# Patient Record
Sex: FEMALE | Race: WHITE | HISPANIC OR LATINO
[De-identification: names, ages, dates, MRNs, and addresses within clinical notes are randomized per-mention and may not be internally consistent; named-entity substitution may affect disease eponyms.]

---

## 2017-08-08 ENCOUNTER — APPOINTMENT (OUTPATIENT)
Dept: ULTRASOUND IMAGING | Facility: HOSPITAL | Age: 42
End: 2017-08-08
Payer: COMMERCIAL

## 2017-08-08 ENCOUNTER — OUTPATIENT (OUTPATIENT)
Dept: OUTPATIENT SERVICES | Facility: HOSPITAL | Age: 42
LOS: 1 days | End: 2017-08-08
Payer: COMMERCIAL

## 2017-08-08 PROCEDURE — 76536 US EXAM OF HEAD AND NECK: CPT

## 2017-08-08 PROCEDURE — 76536 US EXAM OF HEAD AND NECK: CPT | Mod: 26

## 2018-01-18 ENCOUNTER — OUTPATIENT (OUTPATIENT)
Dept: OUTPATIENT SERVICES | Facility: HOSPITAL | Age: 43
LOS: 1 days | End: 2018-01-18
Payer: COMMERCIAL

## 2018-01-18 ENCOUNTER — APPOINTMENT (OUTPATIENT)
Dept: MRI IMAGING | Facility: HOSPITAL | Age: 43
End: 2018-01-18
Payer: COMMERCIAL

## 2018-01-18 DIAGNOSIS — Z00.8 ENCOUNTER FOR OTHER GENERAL EXAMINATION: ICD-10-CM

## 2018-01-18 PROCEDURE — 70553 MRI BRAIN STEM W/O & W/DYE: CPT

## 2018-01-18 PROCEDURE — 70553 MRI BRAIN STEM W/O & W/DYE: CPT | Mod: 26

## 2018-01-18 PROCEDURE — A9579: CPT

## 2018-01-25 ENCOUNTER — OUTPATIENT (OUTPATIENT)
Dept: OUTPATIENT SERVICES | Facility: HOSPITAL | Age: 43
LOS: 1 days | End: 2018-01-25
Payer: COMMERCIAL

## 2018-01-25 ENCOUNTER — APPOINTMENT (OUTPATIENT)
Dept: ULTRASOUND IMAGING | Facility: CLINIC | Age: 43
End: 2018-01-25
Payer: COMMERCIAL

## 2018-01-25 DIAGNOSIS — Z00.8 ENCOUNTER FOR OTHER GENERAL EXAMINATION: ICD-10-CM

## 2018-01-25 PROCEDURE — 76536 US EXAM OF HEAD AND NECK: CPT

## 2018-01-25 PROCEDURE — 76536 US EXAM OF HEAD AND NECK: CPT | Mod: 26

## 2018-05-24 ENCOUNTER — APPOINTMENT (OUTPATIENT)
Dept: NEUROLOGY | Facility: CLINIC | Age: 43
End: 2018-05-24

## 2018-08-14 ENCOUNTER — OUTPATIENT (OUTPATIENT)
Dept: OUTPATIENT SERVICES | Facility: HOSPITAL | Age: 43
LOS: 1 days | End: 2018-08-14
Payer: COMMERCIAL

## 2018-08-14 ENCOUNTER — APPOINTMENT (OUTPATIENT)
Dept: ULTRASOUND IMAGING | Facility: HOSPITAL | Age: 43
End: 2018-08-14
Payer: COMMERCIAL

## 2018-08-14 DIAGNOSIS — Z00.8 ENCOUNTER FOR OTHER GENERAL EXAMINATION: ICD-10-CM

## 2018-08-14 PROCEDURE — 76830 TRANSVAGINAL US NON-OB: CPT | Mod: 26

## 2018-08-14 PROCEDURE — 76830 TRANSVAGINAL US NON-OB: CPT

## 2018-08-14 PROCEDURE — 76856 US EXAM PELVIC COMPLETE: CPT

## 2018-08-14 PROCEDURE — 76856 US EXAM PELVIC COMPLETE: CPT | Mod: 26

## 2018-08-15 ENCOUNTER — FORM ENCOUNTER (OUTPATIENT)
Age: 43
End: 2018-08-15

## 2018-08-16 ENCOUNTER — OUTPATIENT (OUTPATIENT)
Dept: OUTPATIENT SERVICES | Facility: HOSPITAL | Age: 43
LOS: 1 days | End: 2018-08-16
Payer: COMMERCIAL

## 2018-08-16 ENCOUNTER — APPOINTMENT (OUTPATIENT)
Dept: MRI IMAGING | Facility: HOSPITAL | Age: 43
End: 2018-08-16
Payer: COMMERCIAL

## 2018-08-16 DIAGNOSIS — Z00.8 ENCOUNTER FOR OTHER GENERAL EXAMINATION: ICD-10-CM

## 2018-08-16 PROCEDURE — 73721 MRI JNT OF LWR EXTRE W/O DYE: CPT | Mod: 26,RT

## 2018-08-16 PROCEDURE — 73721 MRI JNT OF LWR EXTRE W/O DYE: CPT

## 2019-04-22 ENCOUNTER — OUTPATIENT (OUTPATIENT)
Dept: OUTPATIENT SERVICES | Facility: HOSPITAL | Age: 44
LOS: 1 days | End: 2019-04-22
Payer: COMMERCIAL

## 2019-04-22 DIAGNOSIS — R07.9 CHEST PAIN, UNSPECIFIED: ICD-10-CM

## 2019-04-22 PROCEDURE — 71046 X-RAY EXAM CHEST 2 VIEWS: CPT

## 2019-04-22 PROCEDURE — 93306 TTE W/DOPPLER COMPLETE: CPT | Mod: 26

## 2019-04-22 PROCEDURE — 71046 X-RAY EXAM CHEST 2 VIEWS: CPT | Mod: 26

## 2019-04-22 PROCEDURE — 93306 TTE W/DOPPLER COMPLETE: CPT

## 2019-04-26 ENCOUNTER — APPOINTMENT (OUTPATIENT)
Dept: ULTRASOUND IMAGING | Facility: CLINIC | Age: 44
End: 2019-04-26
Payer: COMMERCIAL

## 2019-04-26 ENCOUNTER — APPOINTMENT (OUTPATIENT)
Dept: RADIOLOGY | Facility: CLINIC | Age: 44
End: 2019-04-26

## 2019-04-26 ENCOUNTER — OUTPATIENT (OUTPATIENT)
Dept: OUTPATIENT SERVICES | Facility: HOSPITAL | Age: 44
LOS: 1 days | End: 2019-04-26
Payer: COMMERCIAL

## 2019-04-26 DIAGNOSIS — Z00.8 ENCOUNTER FOR OTHER GENERAL EXAMINATION: ICD-10-CM

## 2019-04-26 PROCEDURE — 76881 US COMPL JOINT R-T W/IMG: CPT | Mod: 26,RT

## 2019-04-26 PROCEDURE — 76881 US COMPL JOINT R-T W/IMG: CPT

## 2019-04-30 ENCOUNTER — TRANSCRIPTION ENCOUNTER (OUTPATIENT)
Age: 44
End: 2019-04-30

## 2020-01-20 ENCOUNTER — APPOINTMENT (OUTPATIENT)
Dept: CARDIOLOGY | Facility: CLINIC | Age: 45
End: 2020-01-20
Payer: COMMERCIAL

## 2020-01-20 ENCOUNTER — NON-APPOINTMENT (OUTPATIENT)
Age: 45
End: 2020-01-20

## 2020-01-20 VITALS
WEIGHT: 183 LBS | HEART RATE: 78 BPM | DIASTOLIC BLOOD PRESSURE: 69 MMHG | SYSTOLIC BLOOD PRESSURE: 118 MMHG | BODY MASS INDEX: 31.24 KG/M2 | HEIGHT: 64 IN | OXYGEN SATURATION: 96 % | RESPIRATION RATE: 17 BRPM

## 2020-01-20 DIAGNOSIS — R00.2 PALPITATIONS: ICD-10-CM

## 2020-01-20 DIAGNOSIS — Z82.49 FAMILY HISTORY OF ISCHEMIC HEART DISEASE AND OTHER DISEASES OF THE CIRCULATORY SYSTEM: ICD-10-CM

## 2020-01-20 DIAGNOSIS — Z87.19 PERSONAL HISTORY OF OTHER DISEASES OF THE DIGESTIVE SYSTEM: ICD-10-CM

## 2020-01-20 DIAGNOSIS — F41.9 ANXIETY DISORDER, UNSPECIFIED: ICD-10-CM

## 2020-01-20 DIAGNOSIS — Z83.3 FAMILY HISTORY OF DIABETES MELLITUS: ICD-10-CM

## 2020-01-20 DIAGNOSIS — Z85.42 PERSONAL HISTORY OF MALIGNANT NEOPLASM OF OTHER PARTS OF UTERUS: ICD-10-CM

## 2020-01-20 PROCEDURE — 93000 ELECTROCARDIOGRAM COMPLETE: CPT

## 2020-01-20 PROCEDURE — 99244 OFF/OP CNSLTJ NEW/EST MOD 40: CPT

## 2020-01-20 PROCEDURE — 93306 TTE W/DOPPLER COMPLETE: CPT

## 2020-01-20 RX ORDER — ALPRAZOLAM 0.25 MG/1
0.25 TABLET ORAL
Refills: 0 | Status: ACTIVE | COMMUNITY

## 2020-01-20 RX ORDER — FEXOFENADINE HYDROCHLORIDE 180 MG/1
180 TABLET ORAL
Refills: 0 | Status: ACTIVE | COMMUNITY

## 2020-01-20 NOTE — DISCUSSION/SUMMARY
[Risks] : risks [Benefits] : benefits [Patient] : the patient [Alternatives] : alternatives [FreeTextEntry1] : Discussed with patient favor echo today obtain results of blood testing. Presented for stress testing with imaging in further discussion.

## 2020-01-20 NOTE — PHYSICAL EXAM
[General Appearance - Well Developed] : well developed [Normal Appearance] : normal appearance [Well Groomed] : well groomed [General Appearance - Well Nourished] : well nourished [No Deformities] : no deformities [Normal Conjunctiva] : the conjunctiva exhibited no abnormalities [General Appearance - In No Acute Distress] : no acute distress [Eyelids - No Xanthelasma] : the eyelids demonstrated no xanthelasmas [No Oral Pallor] : no oral pallor [No Oral Cyanosis] : no oral cyanosis [Heart Rate And Rhythm] : heart rate and rhythm were normal [Heart Sounds] : normal S1 and S2 [Murmurs] : no murmurs present [Arterial Pulses Normal] : the arterial pulses were normal [Edema] : no peripheral edema present [Respiration, Rhythm And Depth] : normal respiratory rhythm and effort [Exaggerated Use Of Accessory Muscles For Inspiration] : no accessory muscle use [Auscultation Breath Sounds / Voice Sounds] : lungs were clear to auscultation bilaterally [Abdomen Soft] : soft [Abdomen Tenderness] : non-tender [Abdomen Mass (___ Cm)] : no abdominal mass palpated [] : no hepato-splenomegaly [Gait - Sufficient For Exercise Testing] : the gait was sufficient for exercise testing [Abnormal Walk] : normal gait [Nail Clubbing] : no clubbing of the fingernails [Cyanosis, Localized] : no localized cyanosis [Skin Turgor] : normal skin turgor [Skin Color & Pigmentation] : normal skin color and pigmentation [Affect] : the affect was normal [Mood] : the mood was normal [FreeTextEntry1] : JVP normal. No bruits

## 2020-01-20 NOTE — ASSESSMENT
[FreeTextEntry1] : 44-year-old woman with dyspnea on exertion overweight. Family history of unknown type of heart disease and surgery involving her sister.\par \par Premature surgical menopause

## 2020-01-20 NOTE — HISTORY OF PRESENT ILLNESS
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 Patient has some dyspnea on exertion shortness of breath and palpitations when she exerts. She is not very active physically. She has no personal cardiac history.\par \par She denies hypertension or diabetes. She has has some elevation of cholesterol she believes in the past.\par \par Past history of endometrial carcinoma treated surgically. Additional history of asthma noted below and the active problem apparently.\par \par She is a nonsmoker nondrinker pre-K. teacher.\par \par She does not exercise.\par \par Gained weight after her hysterectomy.\par \par

## 2020-02-20 ENCOUNTER — APPOINTMENT (OUTPATIENT)
Dept: CARDIOLOGY | Facility: CLINIC | Age: 45
End: 2020-02-20
Payer: COMMERCIAL

## 2020-02-20 PROCEDURE — 93351 STRESS TTE COMPLETE: CPT

## 2020-02-20 PROCEDURE — 93320 DOPPLER ECHO COMPLETE: CPT

## 2020-02-20 PROCEDURE — 93325 DOPPLER ECHO COLOR FLOW MAPG: CPT

## 2020-08-08 ENCOUNTER — APPOINTMENT (OUTPATIENT)
Dept: MRI IMAGING | Facility: HOSPITAL | Age: 45
End: 2020-08-08
Payer: COMMERCIAL

## 2020-08-08 ENCOUNTER — OUTPATIENT (OUTPATIENT)
Dept: OUTPATIENT SERVICES | Facility: HOSPITAL | Age: 45
LOS: 1 days | End: 2020-08-08
Payer: COMMERCIAL

## 2020-08-08 DIAGNOSIS — Z00.8 ENCOUNTER FOR OTHER GENERAL EXAMINATION: ICD-10-CM

## 2020-08-08 PROCEDURE — 70553 MRI BRAIN STEM W/O & W/DYE: CPT

## 2020-08-08 PROCEDURE — 70553 MRI BRAIN STEM W/O & W/DYE: CPT | Mod: 26

## 2020-08-08 PROCEDURE — A9579: CPT

## 2020-08-21 ENCOUNTER — APPOINTMENT (OUTPATIENT)
Dept: ULTRASOUND IMAGING | Facility: CLINIC | Age: 45
End: 2020-08-21
Payer: COMMERCIAL

## 2020-08-21 ENCOUNTER — OUTPATIENT (OUTPATIENT)
Dept: OUTPATIENT SERVICES | Facility: HOSPITAL | Age: 45
LOS: 1 days | End: 2020-08-21
Payer: COMMERCIAL

## 2020-08-21 DIAGNOSIS — Z00.8 ENCOUNTER FOR OTHER GENERAL EXAMINATION: ICD-10-CM

## 2020-08-21 PROCEDURE — 76881 US COMPL JOINT R-T W/IMG: CPT | Mod: 26,RT

## 2020-08-21 PROCEDURE — 76881 US COMPL JOINT R-T W/IMG: CPT

## 2020-09-03 ENCOUNTER — TRANSCRIPTION ENCOUNTER (OUTPATIENT)
Age: 45
End: 2020-09-03

## 2020-11-22 ENCOUNTER — TRANSCRIPTION ENCOUNTER (OUTPATIENT)
Age: 45
End: 2020-11-22

## 2020-12-10 ENCOUNTER — TRANSCRIPTION ENCOUNTER (OUTPATIENT)
Age: 45
End: 2020-12-10

## 2021-02-24 ENCOUNTER — TRANSCRIPTION ENCOUNTER (OUTPATIENT)
Age: 46
End: 2021-02-24

## 2021-03-07 ENCOUNTER — TRANSCRIPTION ENCOUNTER (OUTPATIENT)
Age: 46
End: 2021-03-07

## 2021-04-01 ENCOUNTER — TRANSCRIPTION ENCOUNTER (OUTPATIENT)
Age: 46
End: 2021-04-01

## 2021-04-13 ENCOUNTER — RESULT REVIEW (OUTPATIENT)
Age: 46
End: 2021-04-13

## 2021-06-13 ENCOUNTER — TRANSCRIPTION ENCOUNTER (OUTPATIENT)
Age: 46
End: 2021-06-13

## 2021-07-22 ENCOUNTER — OUTPATIENT (OUTPATIENT)
Dept: OUTPATIENT SERVICES | Facility: HOSPITAL | Age: 46
LOS: 1 days | End: 2021-07-22
Payer: COMMERCIAL

## 2021-07-22 ENCOUNTER — APPOINTMENT (OUTPATIENT)
Dept: RADIOLOGY | Facility: HOSPITAL | Age: 46
End: 2021-07-22
Payer: COMMERCIAL

## 2021-07-22 DIAGNOSIS — Z00.8 ENCOUNTER FOR OTHER GENERAL EXAMINATION: ICD-10-CM

## 2021-07-22 PROCEDURE — 73130 X-RAY EXAM OF HAND: CPT | Mod: 26,LT

## 2021-07-22 PROCEDURE — 73130 X-RAY EXAM OF HAND: CPT

## 2021-08-06 ENCOUNTER — OUTPATIENT (OUTPATIENT)
Dept: OUTPATIENT SERVICES | Facility: HOSPITAL | Age: 46
LOS: 1 days | End: 2021-08-06
Payer: COMMERCIAL

## 2021-08-06 ENCOUNTER — APPOINTMENT (OUTPATIENT)
Dept: MRI IMAGING | Facility: HOSPITAL | Age: 46
End: 2021-08-06
Payer: COMMERCIAL

## 2021-08-06 DIAGNOSIS — M23.91 UNSPECIFIED INTERNAL DERANGEMENT OF RIGHT KNEE: ICD-10-CM

## 2021-08-06 PROCEDURE — 73721 MRI JNT OF LWR EXTRE W/O DYE: CPT | Mod: 26,RT

## 2021-08-06 PROCEDURE — 73721 MRI JNT OF LWR EXTRE W/O DYE: CPT

## 2022-04-11 ENCOUNTER — EMERGENCY (EMERGENCY)
Facility: HOSPITAL | Age: 47
LOS: 1 days | Discharge: HOME | End: 2022-04-11
Attending: EMERGENCY MEDICINE | Admitting: INTERNAL MEDICINE
Payer: COMMERCIAL

## 2022-04-11 VITALS
DIASTOLIC BLOOD PRESSURE: 65 MMHG | HEART RATE: 61 BPM | RESPIRATION RATE: 20 BRPM | SYSTOLIC BLOOD PRESSURE: 132 MMHG | OXYGEN SATURATION: 96 %

## 2022-04-11 VITALS
HEART RATE: 88 BPM | TEMPERATURE: 99 F | RESPIRATION RATE: 16 BRPM | DIASTOLIC BLOOD PRESSURE: 89 MMHG | OXYGEN SATURATION: 97 % | WEIGHT: 192.02 LBS | HEIGHT: 64 IN | SYSTOLIC BLOOD PRESSURE: 133 MMHG

## 2022-04-11 LAB
ALBUMIN SERPL ELPH-MCNC: 3.7 G/DL — SIGNIFICANT CHANGE UP (ref 3.3–5)
ALP SERPL-CCNC: 84 U/L — SIGNIFICANT CHANGE UP (ref 40–120)
ALT FLD-CCNC: 31 U/L — SIGNIFICANT CHANGE UP (ref 10–45)
ANION GAP SERPL CALC-SCNC: 9 MMOL/L — SIGNIFICANT CHANGE UP (ref 5–17)
AST SERPL-CCNC: 17 U/L — SIGNIFICANT CHANGE UP (ref 10–40)
BASOPHILS # BLD AUTO: 0.03 K/UL — SIGNIFICANT CHANGE UP (ref 0–0.2)
BASOPHILS NFR BLD AUTO: 0.4 % — SIGNIFICANT CHANGE UP (ref 0–2)
BILIRUB SERPL-MCNC: 0.3 MG/DL — SIGNIFICANT CHANGE UP (ref 0.2–1.2)
BUN SERPL-MCNC: 13 MG/DL — SIGNIFICANT CHANGE UP (ref 7–23)
CALCIUM SERPL-MCNC: 9.2 MG/DL — SIGNIFICANT CHANGE UP (ref 8.4–10.5)
CHLORIDE SERPL-SCNC: 106 MMOL/L — SIGNIFICANT CHANGE UP (ref 96–108)
CO2 SERPL-SCNC: 28 MMOL/L — SIGNIFICANT CHANGE UP (ref 22–31)
CREAT SERPL-MCNC: 0.97 MG/DL — SIGNIFICANT CHANGE UP (ref 0.5–1.3)
D DIMER BLD IA.RAPID-MCNC: <150 NG/ML DDU — SIGNIFICANT CHANGE UP
EGFR: 72 ML/MIN/1.73M2 — SIGNIFICANT CHANGE UP
EOSINOPHIL # BLD AUTO: 0.23 K/UL — SIGNIFICANT CHANGE UP (ref 0–0.5)
EOSINOPHIL NFR BLD AUTO: 3.2 % — SIGNIFICANT CHANGE UP (ref 0–6)
GLUCOSE SERPL-MCNC: 126 MG/DL — HIGH (ref 70–99)
HCG SERPL-ACNC: 3 MIU/ML — SIGNIFICANT CHANGE UP
HCT VFR BLD CALC: 42.3 % — SIGNIFICANT CHANGE UP (ref 34.5–45)
HGB BLD-MCNC: 14.4 G/DL — SIGNIFICANT CHANGE UP (ref 11.5–15.5)
IMM GRANULOCYTES NFR BLD AUTO: 0.3 % — SIGNIFICANT CHANGE UP (ref 0–1.5)
LIDOCAIN IGE QN: 99 U/L — SIGNIFICANT CHANGE UP (ref 73–393)
LYMPHOCYTES # BLD AUTO: 2.29 K/UL — SIGNIFICANT CHANGE UP (ref 1–3.3)
LYMPHOCYTES # BLD AUTO: 32.1 % — SIGNIFICANT CHANGE UP (ref 13–44)
MCHC RBC-ENTMCNC: 28.8 PG — SIGNIFICANT CHANGE UP (ref 27–34)
MCHC RBC-ENTMCNC: 34 GM/DL — SIGNIFICANT CHANGE UP (ref 32–36)
MCV RBC AUTO: 84.6 FL — SIGNIFICANT CHANGE UP (ref 80–100)
MONOCYTES # BLD AUTO: 0.52 K/UL — SIGNIFICANT CHANGE UP (ref 0–0.9)
MONOCYTES NFR BLD AUTO: 7.3 % — SIGNIFICANT CHANGE UP (ref 2–14)
NEUTROPHILS # BLD AUTO: 4.04 K/UL — SIGNIFICANT CHANGE UP (ref 1.8–7.4)
NEUTROPHILS NFR BLD AUTO: 56.7 % — SIGNIFICANT CHANGE UP (ref 43–77)
NRBC # BLD: 0 /100 WBCS — SIGNIFICANT CHANGE UP (ref 0–0)
PLATELET # BLD AUTO: 285 K/UL — SIGNIFICANT CHANGE UP (ref 150–400)
POTASSIUM SERPL-MCNC: 3.6 MMOL/L — SIGNIFICANT CHANGE UP (ref 3.5–5.3)
POTASSIUM SERPL-SCNC: 3.6 MMOL/L — SIGNIFICANT CHANGE UP (ref 3.5–5.3)
PROT SERPL-MCNC: 6.8 G/DL — SIGNIFICANT CHANGE UP (ref 6–8.3)
RBC # BLD: 5 M/UL — SIGNIFICANT CHANGE UP (ref 3.8–5.2)
RBC # FLD: 12.5 % — SIGNIFICANT CHANGE UP (ref 10.3–14.5)
SODIUM SERPL-SCNC: 143 MMOL/L — SIGNIFICANT CHANGE UP (ref 135–145)
TROPONIN I, HIGH SENSITIVITY RESULT: 6 NG/L — SIGNIFICANT CHANGE UP
TROPONIN I, HIGH SENSITIVITY RESULT: 6.1 NG/L — SIGNIFICANT CHANGE UP
WBC # BLD: 7.13 K/UL — SIGNIFICANT CHANGE UP (ref 3.8–10.5)
WBC # FLD AUTO: 7.13 K/UL — SIGNIFICANT CHANGE UP (ref 3.8–10.5)

## 2022-04-11 PROCEDURE — 93005 ELECTROCARDIOGRAM TRACING: CPT

## 2022-04-11 PROCEDURE — 93010 ELECTROCARDIOGRAM REPORT: CPT

## 2022-04-11 PROCEDURE — 71045 X-RAY EXAM CHEST 1 VIEW: CPT | Mod: 26

## 2022-04-11 PROCEDURE — 83690 ASSAY OF LIPASE: CPT

## 2022-04-11 PROCEDURE — 85379 FIBRIN DEGRADATION QUANT: CPT

## 2022-04-11 PROCEDURE — 36415 COLL VENOUS BLD VENIPUNCTURE: CPT

## 2022-04-11 PROCEDURE — 84484 ASSAY OF TROPONIN QUANT: CPT

## 2022-04-11 PROCEDURE — 71045 X-RAY EXAM CHEST 1 VIEW: CPT

## 2022-04-11 PROCEDURE — 84702 CHORIONIC GONADOTROPIN TEST: CPT

## 2022-04-11 PROCEDURE — 99285 EMERGENCY DEPT VISIT HI MDM: CPT | Mod: 25

## 2022-04-11 PROCEDURE — 85025 COMPLETE CBC W/AUTO DIFF WBC: CPT

## 2022-04-11 PROCEDURE — 99285 EMERGENCY DEPT VISIT HI MDM: CPT

## 2022-04-11 PROCEDURE — 80053 COMPREHEN METABOLIC PANEL: CPT

## 2022-04-11 RX ORDER — PHENTERMINE HCL 30 MG
1 CAPSULE ORAL
Qty: 0 | Refills: 0 | DISCHARGE

## 2022-04-11 NOTE — ED ADULT NURSE NOTE - OBJECTIVE STATEMENT
Patient states to have had chest pain starting today that ranged from chest to upper L head. Complaint of nausea and abdomen pain of LLQ. Ate about a hour ago cheese and crackers. Complaint sensitively, Started a new medication appetite supression medication about a month ago. Patient states to have had chest pain starting today that ranged from chest to upper L head. Complaint of nausea and abdomen pain of LLQ. Ate about a hour ago cheese and crackers. States to have blurry vision during this epsiode. Complaint sensitively, Started a new medication appetite suppression medication about a month ago.

## 2022-04-11 NOTE — ED PROVIDER NOTE - CLINICAL SUMMARY MEDICAL DECISION MAKING FREE TEXT BOX
pt c brief atypical chest pain with near syncopal episode today. sxs resolved. normal exam, vitals here. neuro intact. check labs, ekg. r/o arrhymia, acs, dehydration, electrolyte abnormality, anemia. reassess    update: ekg, labs unremarkable. vss. remains without sxs. f/u pmd

## 2022-04-11 NOTE — ED PROVIDER NOTE - PATIENT PORTAL LINK FT
You can access the FollowMyHealth Patient Portal offered by [facility] by registering at the following website: [email or web address]. By joining [facility]’s FollowMyHealth portal, you will also be able to view your health information using other applications (apps) compatible with our system.

## 2022-04-11 NOTE — ED PROVIDER NOTE - WR INTERPRETED BY 1
Raiing. [address] [place] [address], [place] [address]. All rights reserved. This information is not intended as a substitute for professional medical care. Always follow your healthcare professional's instructions.        
Nilson Melendez

## 2022-04-11 NOTE — ED ADULT NURSE REASSESSMENT NOTE - NS ED NURSE REASSESS COMMENT FT1
After pt discharged and IV site was dressed, pt noted some blood from site.  She pointed this out to the nurse.  When changing to new dsg for old IV site, pt "fainted" at site of blood, which she reports she has done in the past.  Notified MD Melendez, VSS and pt monitored before we let leave again.

## 2022-04-11 NOTE — ED PROVIDER NOTE - OBJECTIVE STATEMENT
pt c/o mid chest pain, mild, pinching, mid afternoon today, followed by feeling flushed in head, lightheaded, felt like almost passed out for few seconds but no complete LOC, witnessed. CP resolved quickly.  no sweats, n/v, no weakness, numbness, speech or vision changes. no palpitations. no recent illness .

## 2022-04-11 NOTE — ED PROVIDER NOTE - NSFOLLOWUPINSTRUCTIONS_ED_ALL_ED_FT
Follow Up in 1-3 Days with your own doctor or with  [address] [address]  Phone: [phone]    Chest Pain    WHAT YOU NEED TO KNOW:    Chest pain can be caused by a range of conditions, from not serious to life-threatening. Chest pain can be a symptom of a digestive problem, such as acid reflux or a stomach ulcer. An anxiety attack or a strong emotion, such as anger, can also cause chest pain. Infection, inflammation, or a fracture in the bones or cartilage in your chest can cause pain or discomfort. Sometimes chest pain or pressure is caused by poor blood flow to your heart (angina). Chest pain may also be caused by life-threatening conditions such as a heart attack or blood clot in your lungs.     DISCHARGE INSTRUCTIONS:    Call 911 if:   You have any of the following signs of a heart attack:   Squeezing, pressure, or pain in your chest      You may also have any of the following:   Discomfort or pain in your back, neck, jaw, stomach, or arm    Shortness of breath    Nausea or vomiting    Lightheadedness or a sudden cold sweat    Return to the emergency department if:     You have chest discomfort that gets worse, even with medicine.    You cough or vomit blood.     Your bowel movements are black or bloody.     You cannot stop vomiting, or it hurts to swallow.     Contact your healthcare provider if:     You have questions or concerns about your condition or care.        Medicines:     Medicines may be given to treat the cause of your chest pain. Examples include pain medicine, anxiety medicine, or medicines to increase blood flow to your heart.       Do not take certain medicines without asking your healthcare provider first. These include NSAIDs, herbal or vitamin supplements, or hormones (estrogen or progestin).       Take your medicine as directed. Contact your healthcare provider if you think your medicine is not helping or if you have side effects. Tell him or her if you are allergic to any medicine. Keep a list of the medicines, vitamins, and herbs you take. Include the amounts, and when and why you take them. Bring the list or the pill bottles to follow-up visits. Carry your medicine list with you in case of an emergency.    Follow up with your healthcare provider within 72 hours, or as directed: You may need to return for more tests to find the cause of your chest pain. You may be referred to a specialist, such as a cardiologist or gastroenterologist. Write down your questions so you remember to ask them during your visits.     Healthy living tips: The following are general healthy guidelines. If your chest pain is caused by a heart problem, your healthcare provider will give you specific guidelines to follow.    Do not smoke. Nicotine and other chemicals in cigarettes and cigars can cause lung and heart damage. Ask your healthcare provider for information if you currently smoke and need help to quit. E-cigarettes or smokeless tobacco still contain nicotine. Talk to your healthcare provider before you use these products.       Eat a variety of healthy, low-fat, low-salt foods. Healthy foods include fruits, vegetables, whole-grain breads, low-fat dairy products, beans, lean meats, and fish. Ask for more information about a heart healthy diet.    Drink plenty of water every day. Your body is made of mostly water. Water helps your body to control your temperature and blood pressure. Ask your healthcare provider how much water you should drink every day.    Ask about activity. Your healthcare provider will tell you which activities to limit or avoid. Ask when you can drive, return to work, and have sex. Ask about the best exercise plan for you.    Maintain a healthy weight. Ask your healthcare provider how much you should weigh. Ask him or her to help you create a weight loss plan if you are overweight.     Get the flu and pneumonia vaccines. All adults should get the influenza (flu) vaccine. Get it every year as soon as it becomes available. The pneumococcal vaccine is given to adults aged 65 years or older. The vaccine is given every 5 years to prevent pneumococcal disease, such as pneumonia.    If you have a stent:   Carry your stent card with you at all times.   Let all healthcare providers know that you have a stent.      Near Syncope    WHAT YOU NEED TO KNOW:    Near syncope, also called presyncope, is the feeling that you may faint (lose consciousness), but you do not. You can control some health conditions that cause near syncope. Your healthcare providers can help you create a plan to manage near syncope and prevent episodes.    DISCHARGE INSTRUCTIONS:    Return to the emergency department if:   •You have sudden chest pain.       •You have trouble breathing or shortness of breath.       •You have vision changes, are sweating, and have nausea while you are sitting or lying down.       •You feel dizzy or flushed and your heart is fluttering.      •You lose consciousness.      •You cannot use your arm, hand, foot, or leg, or it feels weak.      •You have trouble speaking or understanding others when they speak.      Contact your healthcare provider if:   •You have new or worsening symptoms.      •Your heart beats faster or slower than usual.       •You have questions or concerns about your condition or care.      Medicines:   •Medicines may be needed to help your heart pump strongly and regularly. Your healthcare provider may also make changes to any medicines that are causing syncope.       •Take your medicine as directed. Contact your healthcare provider if you think your medicine is not helping or if you have side effects. Tell him or her if you are allergic to any medicine. Keep a list of the medicines, vitamins, and herbs you take. Include the amounts, and when and why you take them. Bring the list or the pill bottles to follow-up visits. Carry your medicine list with you in case of an emergency.      Follow up with your healthcare provider as directed: You may need more tests to help find the cause of your near syncope episodes. The tests will help healthcare providers plan the best treatment for you. Write down your questions so you remember to ask them during your visits.     Manage near syncope:   •Sit or lie down when needed. This includes when you feel dizzy, your throat is getting tight, and your vision changes. Raise your legs above the level of your heart.      •Take slow, deep breaths if you start to breathe faster with anxiety or fear. This can help decrease dizziness and the feeling that you might faint.       •Keep a record of your near syncope episodes. Include your symptoms and your activity before and after the episode. The record can help your healthcare provider find the cause of your near syncope and help you manage episodes.      Prevent a near syncope episode:   •Move slowly and let yourself get used to one position before you move to another position. This is very important when you change from a lying or sitting position to a standing position. Take some deep breaths before you stand up from a lying position. Stand up slowly. Sudden movements may cause a fainting spell. Sit on the side of the bed or couch for a few minutes before you stand up. If you are on bedrest, try to be upright for about 2 hours each day, or as directed. Do not lock your legs if you are standing for a long period of time. Move your legs and bend your knees to keep blood flowing.      •Follow your healthcare provider's recommendations. Your provider may recommend that you drink more liquids to prevent dehydration. You may also need to have more salt to keep your blood pressure from dropping too low and causing syncope. Your provider will tell you how much liquid and sodium to have each day. He or she will also tell you how much physical activity is safe for you. This will depend on what is causing your near syncope.      •Watch for signs of low blood sugar. These include hunger, nervousness, sweating, and fast or fluttery heartbeats. Talk with your healthcare provider about ways to keep your blood sugar level steady.      •Check your blood pressure often. This is important if you take medicine to lower your blood pressure. Check your blood pressure when you are lying down and when you are standing. Ask how often to check during the day. Keep a record of your blood pressure numbers. Your healthcare provider may use the record to help plan your treatment.  How to take a Blood Pressure           •Do not strain if you are constipated. You may faint if you strain to have a bowel movement. Walking is the best way to get your bowels moving. Eat foods high in fiber to make it easier to have a bowel movement. Good examples are high-fiber cereals, beans, vegetables, and whole-grain breads. Prune juice may help make bowel movements softer.      •Do not exercise outside on a hot day. The combination of physical activity and heat can lead to dehydration. This can cause syncope.

## 2022-04-22 ENCOUNTER — APPOINTMENT (OUTPATIENT)
Dept: MRI IMAGING | Facility: HOSPITAL | Age: 47
End: 2022-04-22

## 2022-05-02 ENCOUNTER — APPOINTMENT (OUTPATIENT)
Dept: OPHTHALMOLOGY | Facility: CLINIC | Age: 47
End: 2022-05-02

## 2022-05-02 ENCOUNTER — APPOINTMENT (OUTPATIENT)
Dept: CARDIOLOGY | Facility: CLINIC | Age: 47
End: 2022-05-02

## 2022-05-15 ENCOUNTER — NON-APPOINTMENT (OUTPATIENT)
Age: 47
End: 2022-05-15

## 2022-06-09 ENCOUNTER — RESULT REVIEW (OUTPATIENT)
Age: 47
End: 2022-06-09

## 2022-07-08 ENCOUNTER — APPOINTMENT (OUTPATIENT)
Dept: CARDIOLOGY | Facility: CLINIC | Age: 47
End: 2022-07-08

## 2022-07-08 VITALS
OXYGEN SATURATION: 98 % | TEMPERATURE: 97.1 F | DIASTOLIC BLOOD PRESSURE: 93 MMHG | HEIGHT: 64 IN | HEART RATE: 66 BPM | RESPIRATION RATE: 16 BRPM | SYSTOLIC BLOOD PRESSURE: 134 MMHG

## 2022-07-08 VITALS — DIASTOLIC BLOOD PRESSURE: 92 MMHG | SYSTOLIC BLOOD PRESSURE: 112 MMHG

## 2022-07-08 DIAGNOSIS — U07.1 COVID-19: ICD-10-CM

## 2022-07-08 DIAGNOSIS — Z00.00 ENCOUNTER FOR GENERAL ADULT MEDICAL EXAMINATION W/OUT ABNORMAL FINDINGS: ICD-10-CM

## 2022-07-08 DIAGNOSIS — Z82.49 FAMILY HISTORY OF ISCHEMIC HEART DISEASE AND OTHER DISEASES OF THE CIRCULATORY SYSTEM: ICD-10-CM

## 2022-07-08 PROCEDURE — 93000 ELECTROCARDIOGRAM COMPLETE: CPT

## 2022-07-08 PROCEDURE — 99215 OFFICE O/P EST HI 40 MIN: CPT

## 2022-07-08 RX ORDER — AMOXICILLIN AND CLAVULANATE POTASSIUM 875; 125 MG/1; MG/1
875-125 TABLET, COATED ORAL
Qty: 20 | Refills: 0 | Status: COMPLETED | COMMUNITY
Start: 2022-04-21

## 2022-07-08 RX ORDER — OMEPRAZOLE 20 MG/1
20 CAPSULE, DELAYED RELEASE ORAL
Qty: 30 | Refills: 0 | Status: ACTIVE | COMMUNITY
Start: 2022-01-10

## 2022-07-08 RX ORDER — NIRMATRELVIR AND RITONAVIR 300-100 MG
20 X 150 MG & KIT ORAL
Qty: 30 | Refills: 0 | Status: COMPLETED | COMMUNITY
Start: 2022-05-17

## 2022-07-08 RX ORDER — PHENTERMINE HYDROCHLORIDE 37.5 MG/1
37.5 CAPSULE ORAL
Qty: 25 | Refills: 0 | Status: COMPLETED | COMMUNITY
Start: 2022-03-31

## 2022-07-08 RX ORDER — METHYLPREDNISOLONE 4 MG/1
4 TABLET ORAL
Qty: 21 | Refills: 0 | Status: COMPLETED | COMMUNITY
Start: 2022-05-23

## 2022-07-08 RX ORDER — AZITHROMYCIN 250 MG/1
250 TABLET, FILM COATED ORAL
Qty: 6 | Refills: 0 | Status: COMPLETED | COMMUNITY
Start: 2022-05-23

## 2022-07-08 RX ORDER — SODIUM SULFATE, POTASSIUM SULFATE, MAGNESIUM SULFATE 17.5; 3.13; 1.6 G/ML; G/ML; G/ML
17.5-3.13-1.6 SOLUTION, CONCENTRATE ORAL
Qty: 354 | Refills: 0 | Status: COMPLETED | COMMUNITY
Start: 2022-04-21

## 2022-07-08 RX ORDER — DOXYCYCLINE 100 MG/1
100 CAPSULE ORAL
Qty: 14 | Refills: 0 | Status: COMPLETED | COMMUNITY
Start: 2022-05-20

## 2022-07-08 RX ORDER — ALBUTEROL SULFATE 90 UG/1
108 (90 BASE) INHALANT RESPIRATORY (INHALATION)
Qty: 8 | Refills: 0 | Status: ACTIVE | COMMUNITY
Start: 2022-05-23

## 2022-07-08 NOTE — CARDIOLOGY SUMMARY
[de-identified] : July 8, 2022 sinus rhythm within normal limits [de-identified] : 2020 normal treadmill [de-identified] : 2022 normal LV function

## 2022-07-08 NOTE — REASON FOR VISIT
[FreeTextEntry3] : Raheem [FreeTextEntry1] : Patient referred by her primary for reassessment.  She has been taking phentermine to attempt weight loss.  She experienced headaches and some discomforts in the chest and discontinued.  Symptoms resolved when she resumed on her own symptoms returned.  She has since discontinued.  She had an echocardiogram which was benign.  She is not exercising.  Chest discomfort her somewhat sharp in the left side not necessarily exertional.\par \par She has no personal cardiac history although family history shows father  of congestive heart failure mother has hypertension.\par \par Diet was reviewed and is heavily Colombian style diet including rice beans Pasta and chicken.  She has pizza once a week.  She does not control the sodium in diet.\par \par She had surgical menopause related to hysterectomy for endometrial cancer.  She is being followed also for a pancreatic cyst.

## 2022-07-08 NOTE — DISCUSSION/SUMMARY
[Patient] : the patient [Risks] : risks [Benefits] : benefits [Alternatives] : alternatives [FreeTextEntry1] : Discussed with patient.  Counseled verbally and in writing on both low-sodium diet and Dash diet.  Discussed exercise and weight loss.  Avoid stimulants.  She may come to antihypertensive therapy.  Lab testing ordered.  Stress echo to be obtained and also follow-up patient blood pressure and labs.  Questions addressed with her. [EKG obtained to assist in diagnosis and management of assessed problem(s)] : EKG obtained to assist in diagnosis and management of assessed problem(s)

## 2022-07-15 LAB
ALBUMIN SERPL ELPH-MCNC: 4.4 G/DL
ALP BLD-CCNC: 78 U/L
ALT SERPL-CCNC: 21 U/L
ANION GAP SERPL CALC-SCNC: 11 MMOL/L
AST SERPL-CCNC: 17 U/L
BASOPHILS # BLD AUTO: 0.03 K/UL
BASOPHILS NFR BLD AUTO: 0.5 %
BILIRUB SERPL-MCNC: 0.4 MG/DL
BUN SERPL-MCNC: 10 MG/DL
CALCIUM SERPL-MCNC: 9.3 MG/DL
CHLORIDE SERPL-SCNC: 107 MMOL/L
CHOLEST SERPL-MCNC: 214 MG/DL
CO2 SERPL-SCNC: 23 MMOL/L
CREAT SERPL-MCNC: 0.83 MG/DL
CRP SERPL HS-MCNC: 4.04 MG/L
EGFR: 86 ML/MIN/1.73M2
EOSINOPHIL # BLD AUTO: 0.28 K/UL
EOSINOPHIL NFR BLD AUTO: 4.2 %
GLUCOSE SERPL-MCNC: 112 MG/DL
HCT VFR BLD CALC: 42.7 %
HDLC SERPL-MCNC: 56 MG/DL
HGB BLD-MCNC: 14.9 G/DL
IMM GRANULOCYTES NFR BLD AUTO: 0.3 %
LDLC SERPL CALC-MCNC: 115 MG/DL
LYMPHOCYTES # BLD AUTO: 1.93 K/UL
LYMPHOCYTES NFR BLD AUTO: 29 %
MAN DIFF?: NORMAL
MCHC RBC-ENTMCNC: 28.7 PG
MCHC RBC-ENTMCNC: 34.9 GM/DL
MCV RBC AUTO: 82.3 FL
MONOCYTES # BLD AUTO: 0.41 K/UL
MONOCYTES NFR BLD AUTO: 6.2 %
NEUTROPHILS # BLD AUTO: 3.98 K/UL
NEUTROPHILS NFR BLD AUTO: 59.8 %
NONHDLC SERPL-MCNC: 158 MG/DL
PLATELET # BLD AUTO: 251 K/UL
POTASSIUM SERPL-SCNC: 4.3 MMOL/L
PROT SERPL-MCNC: 6.5 G/DL
RBC # BLD: 5.19 M/UL
RBC # FLD: 12.7 %
SODIUM SERPL-SCNC: 142 MMOL/L
TRIGL SERPL-MCNC: 215 MG/DL
TSH SERPL-ACNC: 2.55 UIU/ML
WBC # FLD AUTO: 6.65 K/UL

## 2022-07-18 ENCOUNTER — APPOINTMENT (OUTPATIENT)
Dept: CARDIOLOGY | Facility: CLINIC | Age: 47
End: 2022-07-18

## 2022-08-25 ENCOUNTER — APPOINTMENT (OUTPATIENT)
Dept: CARDIOLOGY | Facility: CLINIC | Age: 47
End: 2022-08-25

## 2022-08-25 PROCEDURE — 93351 STRESS TTE COMPLETE: CPT

## 2022-09-01 ENCOUNTER — APPOINTMENT (OUTPATIENT)
Dept: CARDIOLOGY | Facility: CLINIC | Age: 47
End: 2022-09-01

## 2022-10-02 ENCOUNTER — NON-APPOINTMENT (OUTPATIENT)
Age: 47
End: 2022-10-02

## 2022-12-07 ENCOUNTER — OUTPATIENT (OUTPATIENT)
Dept: OUTPATIENT SERVICES | Facility: HOSPITAL | Age: 47
LOS: 1 days | Discharge: HOME | End: 2022-12-07

## 2022-12-07 DIAGNOSIS — R71.8 OTHER ABNORMALITY OF RED BLOOD CELLS: ICD-10-CM

## 2022-12-22 ENCOUNTER — RESULT REVIEW (OUTPATIENT)
Age: 47
End: 2022-12-22

## 2022-12-22 ENCOUNTER — APPOINTMENT (OUTPATIENT)
Dept: HEMATOLOGY ONCOLOGY | Facility: CLINIC | Age: 47
End: 2022-12-22

## 2022-12-22 ENCOUNTER — LABORATORY RESULT (OUTPATIENT)
Age: 47
End: 2022-12-22

## 2022-12-22 VITALS
OXYGEN SATURATION: 98 % | TEMPERATURE: 97.2 F | SYSTOLIC BLOOD PRESSURE: 138 MMHG | HEART RATE: 73 BPM | RESPIRATION RATE: 16 BRPM | BODY MASS INDEX: 32.58 KG/M2 | WEIGHT: 189.81 LBS | DIASTOLIC BLOOD PRESSURE: 90 MMHG

## 2022-12-22 DIAGNOSIS — Z80.42 FAMILY HISTORY OF MALIGNANT NEOPLASM OF PROSTATE: ICD-10-CM

## 2022-12-22 DIAGNOSIS — Z92.89 PERSONAL HISTORY OF OTHER MEDICAL TREATMENT: ICD-10-CM

## 2022-12-22 LAB
BASOPHILS # BLD AUTO: 0.03 K/UL — SIGNIFICANT CHANGE UP (ref 0–0.2)
BASOPHILS NFR BLD AUTO: 0.5 % — SIGNIFICANT CHANGE UP (ref 0–2)
EOSINOPHIL # BLD AUTO: 0.22 K/UL — SIGNIFICANT CHANGE UP (ref 0–0.5)
EOSINOPHIL NFR BLD AUTO: 3.7 % — SIGNIFICANT CHANGE UP (ref 0–6)
HCT VFR BLD CALC: 45.1 % — HIGH (ref 34.5–45)
HGB BLD-MCNC: 15.2 G/DL — SIGNIFICANT CHANGE UP (ref 11.5–15.5)
IMM GRANULOCYTES NFR BLD AUTO: 0.2 % — SIGNIFICANT CHANGE UP (ref 0–0.9)
LYMPHOCYTES # BLD AUTO: 1.69 K/UL — SIGNIFICANT CHANGE UP (ref 1–3.3)
LYMPHOCYTES # BLD AUTO: 28.7 % — SIGNIFICANT CHANGE UP (ref 13–44)
MCHC RBC-ENTMCNC: 28.4 PG — SIGNIFICANT CHANGE UP (ref 27–34)
MCHC RBC-ENTMCNC: 33.7 G/DL — SIGNIFICANT CHANGE UP (ref 32–36)
MCV RBC AUTO: 84.1 FL — SIGNIFICANT CHANGE UP (ref 80–100)
MONOCYTES # BLD AUTO: 0.43 K/UL — SIGNIFICANT CHANGE UP (ref 0–0.9)
MONOCYTES NFR BLD AUTO: 7.3 % — SIGNIFICANT CHANGE UP (ref 2–14)
NEUTROPHILS # BLD AUTO: 3.5 K/UL — SIGNIFICANT CHANGE UP (ref 1.8–7.4)
NEUTROPHILS NFR BLD AUTO: 59.6 % — SIGNIFICANT CHANGE UP (ref 43–77)
NRBC # BLD: 0 /100 WBCS — SIGNIFICANT CHANGE UP (ref 0–0)
PLATELET # BLD AUTO: 248 K/UL — SIGNIFICANT CHANGE UP (ref 150–400)
RBC # BLD: 5.36 M/UL — HIGH (ref 3.8–5.2)
RBC # FLD: 12.6 % — SIGNIFICANT CHANGE UP (ref 10.3–14.5)
RETICS #: 76.6 K/UL — SIGNIFICANT CHANGE UP (ref 25–125)
RETICS/RBC NFR: 1.4 % — SIGNIFICANT CHANGE UP (ref 0.5–2.5)
WBC # BLD: 5.88 K/UL — SIGNIFICANT CHANGE UP (ref 3.8–10.5)
WBC # FLD AUTO: 5.88 K/UL — SIGNIFICANT CHANGE UP (ref 3.8–10.5)

## 2022-12-22 PROCEDURE — 99203 OFFICE O/P NEW LOW 30 MIN: CPT

## 2022-12-23 ENCOUNTER — NON-APPOINTMENT (OUTPATIENT)
Age: 47
End: 2022-12-23

## 2022-12-23 LAB
EPO SERPL-MCNC: 15.6 MIU/ML
FERRITIN SERPL-MCNC: 97 NG/ML
FOLATE SERPL-MCNC: >20 NG/ML
IRON SATN MFR SERPL: 42 %
IRON SERPL-MCNC: 121 UG/DL
SARS-COV-2 N GENE NPH QL NAA+PROBE: NOT DETECTED
TIBC SERPL-MCNC: 289 UG/DL
UIBC SERPL-MCNC: 169 UG/DL
VIT B12 SERPL-MCNC: 401 PG/ML

## 2022-12-23 NOTE — ASSESSMENT
[FreeTextEntry1] : Lab work reviewed.  Patient with history of an intermittently elevated hematocrit since 12/2020 upon review of old lab work available.  Also history of an intermittently elevated RBC since 8/2018 upon review of old lab work available.\par Discussed differential diagnosis of patient's hematologic abnormalities with her, along with potential complications if an underlying myeloproliferative disorder and treatment available.\par Patient is agreeable to follow-up lab work-have included erythropoietin level and JANESSA 2 mutation studies.\par \par Pending the above, can decide if prudent to pursue bone marrow evaluation/aspiration/biopsy procedure with potential benefits/side effects discussed with patient today.  Also discussed potential for empiric phlebotomies, pending course.\par \par Patient was given the opportunity to ask questions.  Her questions have been answered to her apparent satisfaction at this time.  She expressed her understanding and willingness to comply with recommended follow-up.\par \par \par

## 2022-12-23 NOTE — HISTORY OF PRESENT ILLNESS
[de-identified] : 12/22/2022–Patient presenting at the request of her primary care physician for hematology consultation for an elevated RBC and hematocrit level found on routine lab work.\par \par Patient has no current complaints of chest pain, shortness of breath, pruritus, GI//bony complaints.  No lower extremity pain.  No history of unintended weight loss reported, lymph node complaints, abnormal bruising or bleeding.\par \par No known family history of a hematologic disorder.\par Patient first noticed elevated RBC in 2016 after her surgery for endometrial cancer-followed at [facility]-now in survivorship program. Had declined radiation.\par Told has mild sleep apnea (had a sleep study).\par \par Has had COVID vaccines x 2.
H

## 2022-12-23 NOTE — RESULTS/DATA
[FreeTextEntry1] : 10/25/2022–hemoglobin 15.5, hematocrit 45.5, MCV 83, RBC 5.51, WBC 6.16 with 60% neutrophils, 27% lymphocytes, platelet count 277,000.  Creatinine 0.9, calcium 9.8, total bilirubin 0.6.

## 2022-12-23 NOTE — CONSULT LETTER
[Dear  ___] : Dear  [unfilled], [Consult Letter:] : I had the pleasure of evaluating your patient, [unfilled]. [Please see my note below.] : Please see my note below. [Consult Closing:] : Thank you very much for allowing me to participate in the care of this patient.  If you have any questions, please do not hesitate to contact me. [Sincerely,] : Sincerely, [FreeTextEntry3] : Kim Abraham MD

## 2022-12-30 LAB
EXTRACTION: NORMAL
JAK2 P.V617F BLD/T QL: NORMAL
LAB DIRECTOR NAME PROVIDER: NORMAL
LABORATORY COMMENT REPORT: NORMAL
REFLEX:: NORMAL

## 2023-01-19 ENCOUNTER — APPOINTMENT (OUTPATIENT)
Dept: HEMATOLOGY ONCOLOGY | Facility: CLINIC | Age: 48
End: 2023-01-19
Payer: COMMERCIAL

## 2023-01-19 ENCOUNTER — NON-APPOINTMENT (OUTPATIENT)
Age: 48
End: 2023-01-19

## 2023-01-19 PROCEDURE — 99213 OFFICE O/P EST LOW 20 MIN: CPT | Mod: 95

## 2023-01-19 NOTE — ASSESSMENT
[FreeTextEntry1] : Lab work reviewed.  Patient with history of an intermittently elevated hematocrit since 12/2020 upon review of old lab work available.  Also history of an intermittently elevated RBC since 8/2018 upon review of old lab work available.\par Reviewed differential diagnosis of patient's hematologic abnormalities with her, along with potential complications if an underlying myeloproliferative disorder and treatment available.\par 12/30/22 Erythropoietin level and JANESSA 2 mutation studies WNL.\par PO fluid hydration as tolerated. Hematologic surveillance for now. Should hematologic scenario change/worsen, can consider further evaluation, such as BMB.\par \par Patient was given the opportunity to ask questions.  Her questions have been answered to her apparent satisfaction at this time. She is agreeable to f/u.\par

## 2023-01-19 NOTE — REASON FOR VISIT
[Home] : at home, [unfilled] , at the time of the visit. [Medical Office: ([facility])___] : at the medical office located in  [Patient] : the patient [Self] : self [Initial Consultation] : an initial consultation for [FreeTextEntry2] : elevated RBC and Hct.

## 2023-01-19 NOTE — PHYSICAL EXAM
[Normal] : affect appropriate [de-identified] : breathing appeared unlabored [de-identified] : coloration appeared normal

## 2023-01-19 NOTE — HISTORY OF PRESENT ILLNESS
[de-identified] : 12/22/2022–Patient presented at the request of her primary care physician for hematology consultation for an elevated RBC and hematocrit level found on routine lab work.\par \par \par Patient first noticed elevated RBC in 2016 after her surgery for endometrial cancer-followed at [facility]-now in survivorship program. Had declined radiation.\par  [de-identified] : +Occasional dizziness. \par Patient has no current complaints of chest pain, shortness of breath, pruritus, GI//bony complaints.  No lower extremity pain.  No lymph node complaints, abnormal bruising or bleeding.\par \par No known family history of a hematologic disorder.\par \par Told has mild sleep apnea (had a sleep study).\par \par Has had COVID vaccines x 2.

## 2023-02-13 ENCOUNTER — OUTPATIENT (OUTPATIENT)
Dept: OUTPATIENT SERVICES | Facility: HOSPITAL | Age: 48
LOS: 1 days | End: 2023-02-13
Payer: COMMERCIAL

## 2023-02-13 ENCOUNTER — APPOINTMENT (OUTPATIENT)
Dept: MRI IMAGING | Facility: HOSPITAL | Age: 48
End: 2023-02-13
Payer: COMMERCIAL

## 2023-02-13 DIAGNOSIS — Z00.8 ENCOUNTER FOR OTHER GENERAL EXAMINATION: ICD-10-CM

## 2023-02-13 PROCEDURE — 72141 MRI NECK SPINE W/O DYE: CPT | Mod: 26

## 2023-02-13 PROCEDURE — 72141 MRI NECK SPINE W/O DYE: CPT

## 2023-02-16 DIAGNOSIS — R06.00 DYSPNEA, UNSPECIFIED: ICD-10-CM

## 2023-02-21 ENCOUNTER — NON-APPOINTMENT (OUTPATIENT)
Age: 48
End: 2023-02-21

## 2023-02-22 LAB
ALBUMIN SERPL ELPH-MCNC: 4.1 G/DL
ALP BLD-CCNC: 86 U/L
ALT SERPL-CCNC: 19 U/L
ANION GAP SERPL CALC-SCNC: 13 MMOL/L
AST SERPL-CCNC: 17 U/L
BASOPHILS # BLD AUTO: 0.03 K/UL
BASOPHILS NFR BLD AUTO: 0.4 %
BILIRUB SERPL-MCNC: 0.4 MG/DL
BUN SERPL-MCNC: 12 MG/DL
CALCIUM SERPL-MCNC: 9.7 MG/DL
CHLORIDE SERPL-SCNC: 107 MMOL/L
CHOLEST SERPL-MCNC: 188 MG/DL
CO2 SERPL-SCNC: 25 MMOL/L
CREAT SERPL-MCNC: 0.86 MG/DL
EGFR: 83 ML/MIN/1.73M2
EOSINOPHIL # BLD AUTO: 0.35 K/UL
EOSINOPHIL NFR BLD AUTO: 4.8 %
GLUCOSE SERPL-MCNC: 99 MG/DL
HCT VFR BLD CALC: 46 %
HDLC SERPL-MCNC: 57 MG/DL
HGB BLD-MCNC: 15.1 G/DL
IMM GRANULOCYTES NFR BLD AUTO: 0.3 %
LDLC SERPL CALC-MCNC: 92 MG/DL
LYMPHOCYTES # BLD AUTO: 1.7 K/UL
LYMPHOCYTES NFR BLD AUTO: 23.3 %
MAN DIFF?: NORMAL
MCHC RBC-ENTMCNC: 27.9 PG
MCHC RBC-ENTMCNC: 32.8 GM/DL
MCV RBC AUTO: 84.9 FL
MONOCYTES # BLD AUTO: 0.45 K/UL
MONOCYTES NFR BLD AUTO: 6.2 %
NEUTROPHILS # BLD AUTO: 4.75 K/UL
NEUTROPHILS NFR BLD AUTO: 65 %
NONHDLC SERPL-MCNC: 131 MG/DL
PLATELET # BLD AUTO: 270 K/UL
POTASSIUM SERPL-SCNC: 4.1 MMOL/L
PROT SERPL-MCNC: 6.5 G/DL
RBC # BLD: 5.42 M/UL
RBC # FLD: 12.8 %
SODIUM SERPL-SCNC: 145 MMOL/L
T3FREE SERPL-MCNC: 3.1 PG/ML
T4 FREE SERPL-MCNC: 1.2 NG/DL
TRIGL SERPL-MCNC: 194 MG/DL
TSH SERPL-ACNC: 3.47 UIU/ML
WBC # FLD AUTO: 7.3 K/UL

## 2023-03-02 ENCOUNTER — APPOINTMENT (OUTPATIENT)
Dept: CARDIOLOGY | Facility: CLINIC | Age: 48
End: 2023-03-02
Payer: COMMERCIAL

## 2023-03-02 ENCOUNTER — NON-APPOINTMENT (OUTPATIENT)
Age: 48
End: 2023-03-02

## 2023-03-02 VITALS
HEART RATE: 83 BPM | SYSTOLIC BLOOD PRESSURE: 132 MMHG | WEIGHT: 189 LBS | BODY MASS INDEX: 32.27 KG/M2 | DIASTOLIC BLOOD PRESSURE: 80 MMHG | OXYGEN SATURATION: 99 % | HEIGHT: 64 IN

## 2023-03-02 DIAGNOSIS — R06.00 DYSPNEA, UNSPECIFIED: ICD-10-CM

## 2023-03-02 DIAGNOSIS — R94.31 ABNORMAL ELECTROCARDIOGRAM [ECG] [EKG]: ICD-10-CM

## 2023-03-02 DIAGNOSIS — R07.9 CHEST PAIN, UNSPECIFIED: ICD-10-CM

## 2023-03-02 PROCEDURE — 93000 ELECTROCARDIOGRAM COMPLETE: CPT

## 2023-03-02 PROCEDURE — 99214 OFFICE O/P EST MOD 30 MIN: CPT | Mod: 25

## 2023-03-02 NOTE — CARDIOLOGY SUMMARY
[de-identified] : July 8, 2022 sinus rhythm within normal limits\par March 2, 2023 sinus rhythm nonspecific ST-T change [de-identified] : 2020 normal treadmill\par August 2022 completed [place] stage II without ST changes normal stress echo study reported [de-identified] : 2022 normal LV function

## 2023-03-02 NOTE — REASON FOR VISIT
[FreeTextEntry3] : Raheem [FreeTextEntry1] : Reassessed today he has had neurologic evaluation regarding sharp pains in the left side of her head and twitching of her eye.  She indicates that she had work-up including neurovascular work-up which was negative and was advised of the finding of arthritis.\par \par Additionally she indicates that she was getting discomforts in the epigastrium or lower chest area usually at rest and possibly related to stress.\par Related to exertion.  She started taking small dose of aspirin and has not had recurrence of these discomforts in 2 weeks.\par \par She had surgical menopause related to hysterectomy for endometrial cancer.  She is being followed also for a pancreatic cyst.\par \par Additionally she has had recent sleep study regarding sleep apnea and is being tried on a mask.  This is conducted by Dr. Yeager at [facility]

## 2023-03-02 NOTE — DISCUSSION/SUMMARY
[Patient] : the patient [Risks] : risks [Benefits] : benefits [Alternatives] : alternatives [FreeTextEntry1] : Blood work reviewed with her.  Recommended coronary CTA since she had relatively recent stress testing but has no EKG changes.  Follow-up after above.  Questions addressed with her. [EKG obtained to assist in diagnosis and management of assessed problem(s)] : EKG obtained to assist in diagnosis and management of assessed problem(s)

## 2023-03-02 NOTE — ASSESSMENT
[FreeTextEntry1] : Chest pain with new EKG changes seen patient with early surgical menopause family history of early heart disease, overweight.  Had negative stress echo approximately 6 months ago.

## 2023-03-16 ENCOUNTER — APPOINTMENT (OUTPATIENT)
Dept: CT IMAGING | Facility: CLINIC | Age: 48
End: 2023-03-16
Payer: COMMERCIAL

## 2023-03-16 PROCEDURE — 75574 CT ANGIO HRT W/3D IMAGE: CPT

## 2023-03-20 ENCOUNTER — NON-APPOINTMENT (OUTPATIENT)
Age: 48
End: 2023-03-20

## 2023-03-20 DIAGNOSIS — N39.0 URINARY TRACT INFECTION, SITE NOT SPECIFIED: ICD-10-CM

## 2023-03-21 ENCOUNTER — NON-APPOINTMENT (OUTPATIENT)
Age: 48
End: 2023-03-21

## 2023-03-21 DIAGNOSIS — Z78.9 OTHER SPECIFIED HEALTH STATUS: ICD-10-CM

## 2023-03-21 DIAGNOSIS — Z92.89 PERSONAL HISTORY OF OTHER MEDICAL TREATMENT: ICD-10-CM

## 2023-03-21 DIAGNOSIS — Z80.0 FAMILY HISTORY OF MALIGNANT NEOPLASM OF DIGESTIVE ORGANS: ICD-10-CM

## 2023-03-21 DIAGNOSIS — M72.2 PLANTAR FASCIAL FIBROMATOSIS: ICD-10-CM

## 2023-03-21 DIAGNOSIS — K86.2 CYST OF PANCREAS: ICD-10-CM

## 2023-03-21 DIAGNOSIS — R87.612 LOW GRADE SQUAMOUS INTRAEPITHELIAL LESION ON CYTOLOGIC SMEAR OF CERVIX (LGSIL): ICD-10-CM

## 2023-03-21 DIAGNOSIS — Z98.890 OTHER SPECIFIED POSTPROCEDURAL STATES: ICD-10-CM

## 2023-03-21 DIAGNOSIS — Z80.49 FAMILY HISTORY OF MALIGNANT NEOPLASM OF OTHER GENITAL ORGANS: ICD-10-CM

## 2023-03-21 LAB
APPEARANCE: CLEAR
BACTERIA: NEGATIVE
BILIRUBIN URINE: NEGATIVE
BLOOD URINE: NEGATIVE
COLOR: NORMAL
GLUCOSE QUALITATIVE U: NEGATIVE
HYALINE CASTS: 0 /LPF
KETONES URINE: NEGATIVE
LEUKOCYTE ESTERASE URINE: NEGATIVE
MICROSCOPIC-UA: NORMAL
NITRITE URINE: NEGATIVE
PH URINE: 5.5
PROTEIN URINE: NEGATIVE
RED BLOOD CELLS URINE: 1 /HPF
SPECIFIC GRAVITY URINE: 1.02
SQUAMOUS EPITHELIAL CELLS: 0 /HPF
UROBILINOGEN URINE: NORMAL
WHITE BLOOD CELLS URINE: 0 /HPF

## 2023-03-21 RX ORDER — METFORMIN HYDROCHLORIDE 500 MG/1
500 TABLET, COATED ORAL TWICE DAILY
Refills: 0 | Status: ACTIVE | COMMUNITY

## 2023-03-21 RX ORDER — LEVOTHYROXINE SODIUM 0.03 MG/1
25 TABLET ORAL DAILY
Refills: 0 | Status: ACTIVE | COMMUNITY

## 2023-03-21 RX ORDER — ALPRAZOLAM 0.5 MG/1
0.5 TABLET ORAL 3 TIMES DAILY
Refills: 0 | Status: ACTIVE | COMMUNITY

## 2023-03-22 LAB — BACTERIA UR CULT: NORMAL

## 2023-03-23 ENCOUNTER — APPOINTMENT (OUTPATIENT)
Dept: OBGYN | Facility: CLINIC | Age: 48
End: 2023-03-23
Payer: COMMERCIAL

## 2023-03-23 VITALS
WEIGHT: 189 LBS | SYSTOLIC BLOOD PRESSURE: 110 MMHG | BODY MASS INDEX: 32.27 KG/M2 | DIASTOLIC BLOOD PRESSURE: 60 MMHG | HEIGHT: 64 IN | OXYGEN SATURATION: 95 % | RESPIRATION RATE: 16 BRPM | TEMPERATURE: 98 F | HEART RATE: 84 BPM

## 2023-03-23 DIAGNOSIS — N76.2 ACUTE VULVITIS: ICD-10-CM

## 2023-03-23 DIAGNOSIS — Z87.410 PERSONAL HISTORY OF CERVICAL DYSPLASIA: ICD-10-CM

## 2023-03-23 DIAGNOSIS — N89.8 OTHER SPECIFIED NONINFLAMMATORY DISORDERS OF VAGINA: ICD-10-CM

## 2023-03-23 PROCEDURE — 99203 OFFICE O/P NEW LOW 30 MIN: CPT

## 2023-03-23 RX ORDER — CLOTRIMAZOLE AND BETAMETHASONE DIPROPIONATE 10; .5 MG/G; MG/G
1-0.05 CREAM TOPICAL TWICE DAILY
Qty: 1 | Refills: 1 | Status: ACTIVE | COMMUNITY
Start: 2023-03-23 | End: 1900-01-01

## 2023-03-23 NOTE — PHYSICAL EXAM
[Chaperone Declined] : Patient declined chaperone [Appropriately responsive] : appropriately responsive [Alert] : alert [No Acute Distress] : no acute distress [No Lymphadenopathy] : no lymphadenopathy [Soft] : soft [Non-tender] : non-tender [Non-distended] : non-distended [Labia Majora] : normal [Labia Minora Erythema] : erythema [Normal] : normal [Atrophy] : atrophy [Discharge] : a  ~M vaginal discharge was present [Scant] : scant [White] : white [Absent] : absent [Uterine Adnexae] : absent [No Bleeding] : There was no active vaginal bleeding

## 2023-03-23 NOTE — COUNSELING
[Nutrition/ Exercise/ Weight Management] : nutrition, exercise, weight management [Vitamins/Supplements] : vitamins/supplements [Bladder Hygiene] : bladder hygiene [FreeTextEntry2] : OTC Vaginal lubricants

## 2023-03-23 NOTE — HISTORY OF PRESENT ILLNESS
[Patient reported PAP Smear was normal] : Patient reported PAP Smear was normal [Patient reported colonoscopy was normal] : Patient reported colonoscopy was normal [No] : Patient does not have concerns regarding sex [Currently Active] : currently active [Y] : Positive pregnancy history [FreeTextEntry1] : 46 yo c/o smelling vaginal discharge and urine. UA& culture negative [Mammogramdate] : 076/22 [PapSmeardate] : 1/23 [TextBox_31] : porfirio kauffman [ColonoscopyDate] : 3  [PGHxTotal] : 2 [[facility]] : [facility]

## 2023-03-27 ENCOUNTER — NON-APPOINTMENT (OUTPATIENT)
Age: 48
End: 2023-03-27

## 2023-03-27 ENCOUNTER — APPOINTMENT (OUTPATIENT)
Dept: NEUROLOGY | Facility: CLINIC | Age: 48
End: 2023-03-27
Payer: COMMERCIAL

## 2023-03-27 VITALS
BODY MASS INDEX: 43.74 KG/M2 | HEART RATE: 84 BPM | DIASTOLIC BLOOD PRESSURE: 70 MMHG | SYSTOLIC BLOOD PRESSURE: 120 MMHG | WEIGHT: 189 LBS | HEIGHT: 55 IN

## 2023-03-27 DIAGNOSIS — G47.33 OBSTRUCTIVE SLEEP APNEA (ADULT) (PEDIATRIC): ICD-10-CM

## 2023-03-27 PROCEDURE — 99205 OFFICE O/P NEW HI 60 MIN: CPT

## 2023-03-27 RX ORDER — NEBIVOLOL HYDROCHLORIDE 10 MG/1
10 TABLET ORAL
Refills: 0 | Status: ACTIVE | COMMUNITY

## 2023-03-27 NOTE — REASON FOR VISIT
[Initial Evaluation] : an initial evaluation [FreeTextEntry1] : New onset left hemicranial headache paresthesia left eye blurring of vision dizziness and cervical pain

## 2023-03-27 NOTE — ASSESSMENT
[FreeTextEntry1] : Impression: This 47-year-old right-handed female patient presents with onset in September 2022 for episodic left hemicranial headache and paresthesia which begins posteriorly and radiates to the left eye with associated blurred vision and dizziness.  She does have some cervical pain in association.  She describes chronic forgetfulness.  There is underlying anxiety.  There is also a diagnosis of sleep apnea awaiting CPAP.  There is a recent diagnosis of hypertension and improvement in her symptomatology with the addition of Bystolic.  Status post hysterectomy for endometrial carcinoma.  Suspect migraine.  Rule out occipital neuralgia.  Suspect underlying anxiety and possibly depression.  Note diagnosis of sleep apnea.\par \par Recommendations: Continue Bystolic for treatment of hypertension.  MRI of the brain with and without contrast to compare to prior study from 8/8/2020.  Treatment for sleep apnea.  Consider addition of nighttime medication for migraine/insomnia/anxiety/depression.  Lifestyle modification.  Weight reduction and exercise.\par

## 2023-03-27 NOTE — CONSULT LETTER
[Dear  ___] : Dear  [unfilled], [Consult Letter:] : I had the pleasure of evaluating your patient, [unfilled]. [Please see my note below.] : Please see my note below. [Consult Closing:] : Thank you very much for allowing me to participate in the care of this patient.  If you have any questions, please do not hesitate to contact me. [Sincerely,] : Sincerely, [FreeTextEntry3] : Negrito Moon MD\par

## 2023-03-27 NOTE — PHYSICAL EXAM
[FreeTextEntry1] : Head:  Normocephalic Neck: Supple nontender no carotid bruits.  \par \par Mental Status:  Alert Oriented X3 Speech normal and no aphasia or dysarthria.  She became tearful during the exam.  She recalls 1/3 words at 3 minutes\par \par Cranial Nerves:  PERRL, Fundi normal Visual Fields full  EOMI no diplopia no ptosis no nystagmus, V through XII intact.\par \par Motor:  No drift, normal strength tone and coordination and no focal atrophy. No abnormal movements. No dysmetria.  Normal rapid alternating movements. \par \par DTRs: Symmetric and 2+.  Plantars flexor.  No Clonus.\par \par Sensory:  Normal testing with pin light touch and vibration and  Joint position sense.  Normal DSS to touch.\par \par Gait:  Normal including tandem walking heel toe walking and Rhomberg.\par

## 2023-03-27 NOTE — HISTORY OF PRESENT ILLNESS
[FreeTextEntry1] : This patient is seen for an office consultation.  She is a 47-year-old right-handed female patient who states that beginning September 2022 she is recurrent headache which begins posteriorly on the left side radiating throughout the left hemicranium to the left eye with blurring of vision.  There is associated paresthesia and dizziness.  She has seen another neurologist and has had cervical MRI on 2/13/2023 revealing some mild degenerative changes only.  She has had normal carotid and transcranial Dopplers performed in February 2023.  She had a single trigger point injection without any transient benefit.\par \par She was found to be hypertensive and started on Bystolic 10 mg once daily 2 weeks ago with significant resolution of her symptomatology.  She has a mild residual headache.\par \par This patient also describes forgetfulness present for the last several years for such short term and remote events.  There is a history of anxiety for which she will occasionally use Xanax.  She had a remote hysterectomy for endometrial carcinoma without any recurrence.  She recently saw hematology for elevated hematocrit with a benign work-up to date.  She is status post sinus surgery.\par \par She has chronic insomnia and did have an abnormal sleep study suggesting sleep apnea which is being evaluated and CPAP is to be prescribed.\par \par She had an MRI of the brain with and without contrast performed on 8/8/2020 which was basically unremarkable.

## 2023-03-29 LAB
A VAGINAE DNA VAG QL NAA+PROBE: NORMAL
BVAB2 DNA VAG QL NAA+PROBE: NORMAL
C KRUSEI DNA VAG QL NAA+PROBE: NEGATIVE
MEGA1 DNA VAG QL NAA+PROBE: NORMAL
T VAGINALIS RRNA SPEC QL NAA+PROBE: NEGATIVE

## 2023-04-06 ENCOUNTER — APPOINTMENT (OUTPATIENT)
Dept: MRI IMAGING | Facility: HOSPITAL | Age: 48
End: 2023-04-06
Payer: COMMERCIAL

## 2023-04-06 ENCOUNTER — OUTPATIENT (OUTPATIENT)
Dept: OUTPATIENT SERVICES | Facility: HOSPITAL | Age: 48
LOS: 1 days | End: 2023-04-06
Payer: COMMERCIAL

## 2023-04-06 DIAGNOSIS — R68.89 OTHER GENERAL SYMPTOMS AND SIGNS: ICD-10-CM

## 2023-04-06 DIAGNOSIS — R51.9 HEADACHE, UNSPECIFIED: ICD-10-CM

## 2023-04-06 PROCEDURE — 70553 MRI BRAIN STEM W/O & W/DYE: CPT | Mod: 26

## 2023-04-06 PROCEDURE — A9579: CPT

## 2023-04-06 PROCEDURE — 70553 MRI BRAIN STEM W/O & W/DYE: CPT

## 2023-04-10 ENCOUNTER — NON-APPOINTMENT (OUTPATIENT)
Age: 48
End: 2023-04-10

## 2023-04-13 ENCOUNTER — APPOINTMENT (OUTPATIENT)
Dept: CARDIOLOGY | Facility: CLINIC | Age: 48
End: 2023-04-13
Payer: COMMERCIAL

## 2023-04-13 ENCOUNTER — APPOINTMENT (OUTPATIENT)
Dept: CARDIOLOGY | Facility: CLINIC | Age: 48
End: 2023-04-13

## 2023-04-13 ENCOUNTER — NON-APPOINTMENT (OUTPATIENT)
Age: 48
End: 2023-04-13

## 2023-04-13 VITALS
TEMPERATURE: 98.6 F | DIASTOLIC BLOOD PRESSURE: 66 MMHG | HEART RATE: 77 BPM | OXYGEN SATURATION: 96 % | BODY MASS INDEX: 32.27 KG/M2 | SYSTOLIC BLOOD PRESSURE: 108 MMHG | HEIGHT: 64 IN | WEIGHT: 189 LBS

## 2023-04-13 LAB
BASOPHILS # BLD AUTO: 0.03 K/UL
BASOPHILS NFR BLD AUTO: 0.5 %
EOSINOPHIL # BLD AUTO: 0.24 K/UL
EOSINOPHIL NFR BLD AUTO: 3.8 %
HCT VFR BLD CALC: 44.8 %
HGB BLD-MCNC: 15 G/DL
IMM GRANULOCYTES NFR BLD AUTO: 0.3 %
LYMPHOCYTES # BLD AUTO: 2.01 K/UL
LYMPHOCYTES NFR BLD AUTO: 31.5 %
MAN DIFF?: NORMAL
MCHC RBC-ENTMCNC: 28.1 PG
MCHC RBC-ENTMCNC: 33.5 GM/DL
MCV RBC AUTO: 84.1 FL
MONOCYTES # BLD AUTO: 0.5 K/UL
MONOCYTES NFR BLD AUTO: 7.8 %
NEUTROPHILS # BLD AUTO: 3.58 K/UL
NEUTROPHILS NFR BLD AUTO: 56.1 %
PLATELET # BLD AUTO: 265 K/UL
RBC # BLD: 5.33 M/UL
RBC # FLD: 12.8 %
WBC # FLD AUTO: 6.38 K/UL

## 2023-04-13 PROCEDURE — 99214 OFFICE O/P EST MOD 30 MIN: CPT | Mod: 25

## 2023-04-13 PROCEDURE — 93000 ELECTROCARDIOGRAM COMPLETE: CPT

## 2023-04-13 NOTE — CARDIOLOGY SUMMARY
[de-identified] : July 8, 2022 sinus rhythm within normal limits\par March 2, 2023 sinus rhythm nonspecific ST-T change [de-identified] : 2020 normal treadmill\par August 2022 completed London stage II without ST changes normal stress echo study reported [de-identified] : 2022 normal LV function [de-identified] : 2023 CAC1 mild non calcified plaque

## 2023-04-13 NOTE — DISCUSSION/SUMMARY
[Patient] : the patient [Risks] : risks [Benefits] : benefits [Alternatives] : alternatives [___ Month(s)] : in [unfilled] month(s) [FreeTextEntry1] : Advised to maintain by systolic.  She is reluctant to initiate statin risks and benefits side effects discussed.  Placed on Mediterranean diet.  Advised weight loss and exercise repeat blood work before next visit in approximately 4 months.  Medical follow-up Dr. Joaquin.

## 2023-04-13 NOTE — REASON FOR VISIT
[FreeTextEntry3] : Raheem [FreeTextEntry1] : Follow-up visit she feels much better nebivolol.  No headache chest pain shortness of breath.  Had neurologic work-up with Dr. Moon including MRI showing microvascular disease.  Had CTA with small degree of coronary plaquing calcium score of 1.  She is being treated for sleep apnea.

## 2023-04-13 NOTE — ASSESSMENT
[FreeTextEntry1] : Improved symptomatology noted blood pressure well controlled being treated for sleep apnea.  She has some coronary atherosclerosis without stenosis.  Overweight.  Lipids as noted above.

## 2023-06-28 ENCOUNTER — APPOINTMENT (OUTPATIENT)
Dept: NEUROLOGY | Facility: CLINIC | Age: 48
End: 2023-06-28
Payer: COMMERCIAL

## 2023-06-28 VITALS
OXYGEN SATURATION: 96 % | SYSTOLIC BLOOD PRESSURE: 118 MMHG | WEIGHT: 189 LBS | BODY MASS INDEX: 32.27 KG/M2 | RESPIRATION RATE: 16 BRPM | TEMPERATURE: 96.3 F | DIASTOLIC BLOOD PRESSURE: 78 MMHG | HEART RATE: 75 BPM | HEIGHT: 64 IN

## 2023-06-28 DIAGNOSIS — R51.9 HEADACHE, UNSPECIFIED: ICD-10-CM

## 2023-06-28 DIAGNOSIS — R68.89 OTHER GENERAL SYMPTOMS AND SIGNS: ICD-10-CM

## 2023-06-28 DIAGNOSIS — R42 DIZZINESS AND GIDDINESS: ICD-10-CM

## 2023-06-28 PROCEDURE — 99214 OFFICE O/P EST MOD 30 MIN: CPT

## 2023-06-28 NOTE — ASSESSMENT
[FreeTextEntry1] : Impression: This 47-year-old female patient has had left hemicranial headache paresthesia and left eye visual blurring which does begin posteriorly.  There is some associated dizziness.  Symptomatology is definitely moderated since she was last seen in since she has been taking Bystolic for hypertension.  Suspect underlying migraine.  Rule out a component of occipital neuralgia.  Bystolic being a beta-blocker would have efficacy for migraine as well as hypertension.  Doubt that the forgetfulness suggests significant cerebral dysfunction.\par \par Recommendations: The patient was given reassurance regarding the improvement in her neurological symptomatology and the brain MRI which is unremarkable and could be consistent with migraine.  Continue Bystolic as ordered.  Treatment of sleep apnea to continue.  Lifestyle modification weight reduction.  Defer any additional medication for her neurological symptomatology at this juncture.  Office follow-up on an as-needed basis peer\par

## 2023-06-28 NOTE — HISTORY OF PRESENT ILLNESS
[FreeTextEntry1] : This patient is seen for an office visit.  There is improvement with respect to her left sided headache paresthesia and left eye blurring of vision since she was last seen on 3/27/2023.  She has had only a rare episode.  Dizziness has moderated.  Forgetfulness is not a significant complaint at the time of today's visit\par \par  She did have an MRI of the brain performed with and without contrast on 4/6/2023 revealing some scattered white matter hyperintense foci considered nonspecific and can be consistent with migraine.\par \par Headaches have moderated since she started the Bystolic 10 mg once daily.\par \par She received the CPAP machine and she is having some difficulty using the mask.

## 2023-06-28 NOTE — PHYSICAL EXAM
[FreeTextEntry1] : Head:  Normocephalic Neck: Supple nontender no carotid bruits. \par \par Mental Status:  Alert Oriented X3 Speech normal and no aphasia or dysarthria.\par \par Cranial Nerves:  PERRL, Visual Fields full  EOMI no diplopia no ptosis no nystagmus, V through XII intact.\par \par Motor:  No drift, normal strength tone and coordination and no focal atrophy. No abnormal movements. No dysmetria.  Normal rapid alternating movements. \par \par DTRs: Symmetric and 2+.  Plantars flexor.  No Clonus.\par \par Sensory:  Normal testing with pin light touch bilaterally.\par \par Gait:  Normal including tandem walking heel toe walking and Rhomberg.\par

## 2023-06-29 ENCOUNTER — APPOINTMENT (OUTPATIENT)
Dept: OBGYN | Facility: CLINIC | Age: 48
End: 2023-06-29
Payer: COMMERCIAL

## 2023-06-29 VITALS
WEIGHT: 189 LBS | OXYGEN SATURATION: 98 % | HEIGHT: 64 IN | TEMPERATURE: 97.9 F | HEART RATE: 97 BPM | BODY MASS INDEX: 32.27 KG/M2 | SYSTOLIC BLOOD PRESSURE: 120 MMHG | DIASTOLIC BLOOD PRESSURE: 80 MMHG

## 2023-06-29 DIAGNOSIS — R35.0 FREQUENCY OF MICTURITION: ICD-10-CM

## 2023-06-29 DIAGNOSIS — Z01.419 ENCOUNTER FOR GYNECOLOGICAL EXAMINATION (GENERAL) (ROUTINE) W/OUT ABNORMAL FINDINGS: ICD-10-CM

## 2023-06-29 DIAGNOSIS — Z12.72 ENCOUNTER FOR SCREENING FOR MALIGNANT NEOPLASM OF VAGINA: ICD-10-CM

## 2023-06-29 PROCEDURE — 99396 PREV VISIT EST AGE 40-64: CPT

## 2023-06-29 NOTE — PHYSICAL EXAM
[Chaperone Declined] : Patient declined chaperone [Appropriately responsive] : appropriately responsive [Alert] : alert [No Acute Distress] : no acute distress [No Lymphadenopathy] : no lymphadenopathy [Soft] : soft [Non-tender] : non-tender [Non-distended] : non-distended [Oriented x3] : oriented x3 [Examination Of The Breasts] : a normal appearance [No Masses] : no breast masses were palpable [Labia Majora] : normal [Labia Minora] : normal [Normal] :  normal [No Bleeding] : There was no active vaginal bleeding [Absent] : absent [Atrophy] : atrophy [Dry Mucosa] : dry mucosa [FreeTextEntry4] : no lesion, induration or tenderness

## 2023-06-29 NOTE — HISTORY OF PRESENT ILLNESS
[postmenopausal] : postmenopausal [Y] : Positive pregnancy history [FreeTextEntry1] : 48 yo  presents for annual w/o complaints  [Mammogramdate] : 07/22 [BreastSonogramDate] : 07/22 [PapSmeardate] : 2020 [ColonoscopyDate] : 2022 [PGHxTotal] : 2 [[facility]] : 200 [PGHxAbortions] : 0 [[facility]] : 2

## 2023-06-29 NOTE — COUNSELING
[Nutrition/ Exercise/ Weight Management] : nutrition, exercise, weight management [Vitamins/Supplements] : vitamins/supplements [Breast Self Exam] : breast self exam [Bladder Hygiene] : bladder hygiene [FreeTextEntry2] : weight bearing exercises

## 2023-07-04 LAB
BACTERIA UR CULT: NORMAL
CYTOLOGY CVX/VAG DOC THIN PREP: NORMAL
HPV 16 E6+E7 MRNA CVX QL NAA+PROBE: NOT DETECTED
HPV HIGH+LOW RISK DNA PNL CVX: NOT DETECTED
HPV18+45 E6+E7 MRNA CVX QL NAA+PROBE: NOT DETECTED

## 2023-08-09 ENCOUNTER — OUTPATIENT (OUTPATIENT)
Dept: OUTPATIENT SERVICES | Facility: HOSPITAL | Age: 48
LOS: 1 days | Discharge: HOME | End: 2023-08-09

## 2023-08-09 DIAGNOSIS — R71.8 OTHER ABNORMALITY OF RED BLOOD CELLS: ICD-10-CM

## 2023-08-10 PROBLEM — R71.8 ELEVATED HEMATOCRIT: Status: ACTIVE | Noted: 2022-12-18

## 2023-08-11 ENCOUNTER — APPOINTMENT (OUTPATIENT)
Dept: CARDIOLOGY | Facility: CLINIC | Age: 48
End: 2023-08-11
Payer: COMMERCIAL

## 2023-08-11 ENCOUNTER — RX RENEWAL (OUTPATIENT)
Age: 48
End: 2023-08-11

## 2023-08-11 VITALS
HEART RATE: 66 BPM | BODY MASS INDEX: 32.95 KG/M2 | DIASTOLIC BLOOD PRESSURE: 70 MMHG | SYSTOLIC BLOOD PRESSURE: 103 MMHG | RESPIRATION RATE: 17 BRPM | HEIGHT: 64 IN | OXYGEN SATURATION: 95 % | WEIGHT: 193 LBS

## 2023-08-11 PROCEDURE — 99214 OFFICE O/P EST MOD 30 MIN: CPT | Mod: 25

## 2023-08-11 PROCEDURE — 93000 ELECTROCARDIOGRAM COMPLETE: CPT

## 2023-08-11 NOTE — ASSESSMENT
[FreeTextEntry1] : Palpitations controlled as is Bp on nevibilol  She has some coronary  and carotid atherosclerosis without stenosis.  .  Lipids as noted above.

## 2023-08-11 NOTE — DISCUSSION/SUMMARY
[Patient] : the patient [Risks] : risks [Benefits] : benefits [Alternatives] : alternatives [___ Month(s)] : in [unfilled] month(s) [FreeTextEntry1] : Advised to maintain by systolic.  She is reluctant to initiate statin risks and benefits side effects discussed, agrees to try lipitor. Placed on Mediterranean diet.  Advised weight loss and exercise repeat blood work before next visit in approximately 6 months.  Medical follow-up Dr. Joaquin. [EKG obtained to assist in diagnosis and management of assessed problem(s)] : EKG obtained to assist in diagnosis and management of assessed problem(s)

## 2023-08-11 NOTE — REASON FOR VISIT
[FreeTextEntry3] : Raheem [FreeTextEntry1] : Still occasional left temporal headache, with scalp tenderness, being evaluated by Dr. Maciel. Had neurologic work-up with Dr. Moon including MRI showing microvascular disease.  Had CTA with small degree of coronary plaquing calcium score of 1.  She is being treated for sleep apnea. Had prior carotid test with plaque present.  Denied chest pain, dyspnea or palpitations. Not exercising.

## 2023-08-11 NOTE — CARDIOLOGY SUMMARY
[de-identified] : July 8, 2022 sinus rhythm within normal limits March 2, 2023 sinus rhythm nonspecific ST-T change 8.11.23 sinus wnl [de-identified] : 2020 normal treadmill\par  August 2022 completed London stage II without ST changes normal stress echo study reported [de-identified] : 2022 normal LV function [de-identified] : 2023 CAC1 mild non calcified plaque [de-identified] : 2/23 carotid Duplex + mild plaque

## 2023-08-14 ENCOUNTER — APPOINTMENT (OUTPATIENT)
Dept: HEMATOLOGY ONCOLOGY | Facility: CLINIC | Age: 48
End: 2023-08-14
Payer: COMMERCIAL

## 2023-08-14 DIAGNOSIS — R71.8 OTHER ABNORMALITY OF RED BLOOD CELLS: ICD-10-CM

## 2023-08-14 PROCEDURE — 99213 OFFICE O/P EST LOW 20 MIN: CPT | Mod: 95

## 2023-08-14 NOTE — REASON FOR VISIT
[Initial Consultation] : an initial consultation for [Home] : at home, [unfilled] , at the time of the visit. [Medical Office: ([facility])___] : at the medical office located in  [Patient] : the patient [Self] : self [FreeTextEntry2] : elevated RBC and Hct.

## 2023-08-14 NOTE — HISTORY OF PRESENT ILLNESS
[de-identified] : 12/22/2022-Patient presented at the request of her primary care physician for hematology consultation for an elevated RBC and hematocrit level found on routine lab work.\par  \par  \par  Patient first noticed elevated RBC in 2016 after her surgery for endometrial cancer-followed at [facility]-now in survivorship program. Had declined radiation.\par   [de-identified] : Started on Bystolic for HTN. Placed on cholesterol med per cardiology-plans f/u with PCP to further discuss. Patient has no current complaints of chest pain, shortness of breath, pruritus, GI//bony complaints.  No lower extremity pain.  No lymph node complaints, abnormal bruising or bleeding. Sees PCP yearly and PRN.  No known family history of a hematologic disorder.

## 2023-08-14 NOTE — PHYSICAL EXAM
[Normal] : affect appropriate [de-identified] : breathing appeared unlabored [de-identified] : coloration appeared normal

## 2023-08-14 NOTE — CONSULT LETTER
[Dear  ___] : Dear  [unfilled], [Consult Letter:] : I had the pleasure of evaluating your patient, [unfilled]. [Please see my note below.] : Please see my note below. [Consult Closing:] : Thank you very much for allowing me to participate in the care of this patient.  If you have any questions, please do not hesitate to contact me. [Sincerely,] : Sincerely, [FreeTextEntry3] : Kim bAraham MD

## 2023-08-14 NOTE — ASSESSMENT
[FreeTextEntry1] : CBC reviewed.   Patient with history of an intermittently elevated hematocrit since 12/2020 upon review of old lab work available.  Also history of an intermittently elevated RBC since at least 8/2018. Reviewed differential diagnosis of patient's hematologic abnormalities with her, along with potential complications if an underlying myeloproliferative disorder and treatment available. 12/30/22 Erythropoietin level and JANESSA 2 mutation studies WNL. PO fluid hydration as tolerated. Most recent H/H available, WNL.  Patient will no longer actively f/u in this office, and will continue to have lab work monitored by PCP. Should hematologic scenario change/worsen, she will call, and then can consider further evaluation as clinically indicated.  Patient was given the opportunity to ask questions.  Her questions have been answered to her apparent satisfaction at this time. She was appreciative of care.  -->RTO PRN.

## 2024-02-15 DIAGNOSIS — I25.10 ATHEROSCLEROTIC HEART DISEASE OF NATIVE CORONARY ARTERY W/OUT ANGINA PECTORIS: ICD-10-CM

## 2024-02-15 DIAGNOSIS — R00.2 PALPITATIONS: ICD-10-CM

## 2024-02-25 LAB
ALBUMIN SERPL ELPH-MCNC: 4.4 G/DL
ALP BLD-CCNC: 81 U/L
ALT SERPL-CCNC: 46 U/L
ANION GAP SERPL CALC-SCNC: 11 MMOL/L
AST SERPL-CCNC: 27 U/L
BILIRUB SERPL-MCNC: 0.4 MG/DL
BUN SERPL-MCNC: 12 MG/DL
CALCIUM SERPL-MCNC: 9.8 MG/DL
CHLORIDE SERPL-SCNC: 108 MMOL/L
CHOLEST SERPL-MCNC: 140 MG/DL
CO2 SERPL-SCNC: 24 MMOL/L
CREAT SERPL-MCNC: 0.88 MG/DL
EGFR: 80 ML/MIN/1.73M2
GLUCOSE SERPL-MCNC: 110 MG/DL
HDLC SERPL-MCNC: 54 MG/DL
LDLC SERPL CALC-MCNC: 53 MG/DL
NONHDLC SERPL-MCNC: 85 MG/DL
POTASSIUM SERPL-SCNC: 4.6 MMOL/L
PROT SERPL-MCNC: 6.4 G/DL
SODIUM SERPL-SCNC: 143 MMOL/L
TRIGL SERPL-MCNC: 201 MG/DL
TSH SERPL-ACNC: 3.05 UIU/ML

## 2024-02-26 LAB — APO LP(A) SERPL-MCNC: 152 NMOL/L

## 2024-03-07 ENCOUNTER — APPOINTMENT (OUTPATIENT)
Dept: CARDIOLOGY | Facility: CLINIC | Age: 49
End: 2024-03-07
Payer: COMMERCIAL

## 2024-03-07 ENCOUNTER — NON-APPOINTMENT (OUTPATIENT)
Age: 49
End: 2024-03-07

## 2024-03-07 VITALS
DIASTOLIC BLOOD PRESSURE: 66 MMHG | OXYGEN SATURATION: 96 % | HEART RATE: 68 BPM | BODY MASS INDEX: 33.12 KG/M2 | WEIGHT: 194 LBS | SYSTOLIC BLOOD PRESSURE: 101 MMHG | HEIGHT: 64 IN

## 2024-03-07 DIAGNOSIS — E78.41 ELEVATED LIPOPROTEIN(A): ICD-10-CM

## 2024-03-07 PROCEDURE — 93000 ELECTROCARDIOGRAM COMPLETE: CPT

## 2024-03-07 PROCEDURE — 99214 OFFICE O/P EST MOD 30 MIN: CPT | Mod: 25

## 2024-03-07 NOTE — HISTORY OF PRESENT ILLNESS
[FreeTextEntry1] : Follow-up visit doing intermittent fasting tolerating medication without problem not exercising much however

## 2024-03-07 NOTE — CARDIOLOGY SUMMARY
[de-identified] : July 8, 2022 sinus rhythm within normal limits March 2, 2023 sinus rhythm nonspecific ST-T change 8.11.23 sinus wnl March 2024 normal [de-identified] : 2020 normal treadmill\par  August 2022 completed London stage II without ST changes normal stress echo study reported [de-identified] : 2023 CAC1 mild non calcified plaque [de-identified] : 2022 normal LV function [de-identified] : 2/23 carotid Duplex + mild plaque

## 2024-03-07 NOTE — PHYSICAL EXAM
[Well Developed] : well developed [Well Nourished] : well nourished [No Acute Distress] : no acute distress [Normal Conjunctiva] : normal conjunctiva [Normal Venous Pressure] : normal venous pressure [Normal S1, S2] : normal S1, S2 [No Carotid Bruit] : no carotid bruit [No Murmur] : no murmur [No Rub] : no rub [No Gallop] : no gallop [Clear Lung Fields] : clear lung fields [Good Air Entry] : good air entry [No Respiratory Distress] : no respiratory distress  [Non Tender] : non-tender [Soft] : abdomen soft [No Masses/organomegaly] : no masses/organomegaly [Normal Bowel Sounds] : normal bowel sounds [Normal Gait] : normal gait [No Edema] : no edema [No Cyanosis] : no cyanosis [No Clubbing] : no clubbing [No Varicosities] : no varicosities [Moves all extremities] : moves all extremities [No Focal Deficits] : no focal deficits [Normal Speech] : normal speech [Alert and Oriented] : alert and oriented [Normal memory] : normal memory

## 2024-03-07 NOTE — ASSESSMENT
[FreeTextEntry1] : Palpitations controlled as is Bp on nevibilol  She has some coronary  and carotid atherosclerosis without stenosis.  .  Lipids as noted above.  Elevated lipoprotein small a elevated glucose

## 2024-03-07 NOTE — DISCUSSION/SUMMARY
[Patient] : the patient [Risks] : risks [Alternatives] : alternatives [Benefits] : benefits [___ Month(s)] : in [unfilled] month(s) [FreeTextEntry1] : Advise regarding her lipids and findings side effects of statin including elevated glucose were discussed as well discussed diet weight loss exercise and familial counseling of her children regarding lipoprotein small a follow-up with PMD and may see me annually and as needed questions addressed with her.  Maintain atorvastatin and bystolic [EKG obtained to assist in diagnosis and management of assessed problem(s)] : EKG obtained to assist in diagnosis and management of assessed problem(s)

## 2024-03-21 NOTE — ED PROVIDER NOTE - NEUROLOGICAL STRAIGHT LEG RAISE
Discussed with patient use of sedative  medications and good  sleep hygiene to maximize treatment for this problem. Pt  to use sedatives only prior to sleep and cautioned them about usage at any other time. All patient questions about this problem answered today.    normal bilaterally

## 2024-05-14 ENCOUNTER — NON-APPOINTMENT (OUTPATIENT)
Age: 49
End: 2024-05-14

## 2024-05-16 ENCOUNTER — APPOINTMENT (OUTPATIENT)
Dept: CARDIOLOGY | Facility: CLINIC | Age: 49
End: 2024-05-16
Payer: COMMERCIAL

## 2024-05-16 ENCOUNTER — NON-APPOINTMENT (OUTPATIENT)
Age: 49
End: 2024-05-16

## 2024-05-16 VITALS
DIASTOLIC BLOOD PRESSURE: 70 MMHG | SYSTOLIC BLOOD PRESSURE: 106 MMHG | OXYGEN SATURATION: 96 % | WEIGHT: 191 LBS | HEIGHT: 64 IN | BODY MASS INDEX: 32.61 KG/M2 | HEART RATE: 67 BPM

## 2024-05-16 DIAGNOSIS — I65.23 OCCLUSION AND STENOSIS OF BILATERAL CAROTID ARTERIES: ICD-10-CM

## 2024-05-16 DIAGNOSIS — R07.9 CHEST PAIN, UNSPECIFIED: ICD-10-CM

## 2024-05-16 DIAGNOSIS — R03.0 ELEVATED BLOOD-PRESSURE READING, W/OUT DIAGNOSIS OF HYPERTENSION: ICD-10-CM

## 2024-05-16 DIAGNOSIS — R09.89 OTHER SPECIFIED SYMPTOMS AND SIGNS INVOLVING THE CIRCULATORY AND RESPIRATORY SYSTEMS: ICD-10-CM

## 2024-05-16 PROCEDURE — 99214 OFFICE O/P EST MOD 30 MIN: CPT | Mod: 25

## 2024-05-16 PROCEDURE — 93000 ELECTROCARDIOGRAM COMPLETE: CPT

## 2024-05-16 NOTE — ASSESSMENT
[FreeTextEntry1] : Palpitations controlled as is Bp on nevibilol  She has some coronary  and carotid atherosclerosis without stenosis.  .  Lipids as noted above.  Elevated lipoprotein small a elevated glucose  Spikes in blood pressure may be dietary or emotional rule out other causes

## 2024-05-16 NOTE — DISCUSSION/SUMMARY
[Patient] : the patient [Risks] : risks [Benefits] : benefits [Alternatives] : alternatives [___ Month(s)] : in [unfilled] month(s) [FreeTextEntry1] :  Maintain atorvastatin and bystolic testing as above see me for results.  Low-sodium diet handout given and reassured regarding absence of coronary atherosclerosis but abnormal carotid study noted which is to be repeated questions addressed with her. [EKG obtained to assist in diagnosis and management of assessed problem(s)] : EKG obtained to assist in diagnosis and management of assessed problem(s)

## 2024-05-16 NOTE — CARDIOLOGY SUMMARY
[de-identified] : July 8, 2022 sinus rhythm within normal limits March 2, 2023 sinus rhythm nonspecific ST-T change 8.11.23 sinus wnl May 16, 2024 sinus WNL March 2024 normal [de-identified] : 2020 normal treadmill\par  August 2022 completed [place] stage II without ST changes normal stress echo study reported [de-identified] : 2022 normal LV function [de-identified] : 2023 CAC1 mild non calcified plaque [de-identified] : 2/23 carotid Duplex + mild plaque

## 2024-05-16 NOTE — REASON FOR VISIT
[Symptom and Test Evaluation] : symptom and test evaluation [Hypertension] : hypertension [FreeTextEntry3] : Raheem

## 2024-05-17 LAB
ALBUMIN SERPL ELPH-MCNC: 4.3 G/DL
ALDOSTERONE SERUM: 6.5 NG/DL
ANION GAP SERPL CALC-SCNC: 11 MMOL/L
APPEARANCE: CLEAR
BILIRUBIN URINE: NEGATIVE
BLOOD URINE: NEGATIVE
BUN SERPL-MCNC: 19 MG/DL
CALCIUM SERPL-MCNC: 9.5 MG/DL
CHLORIDE SERPL-SCNC: 105 MMOL/L
CO2 SERPL-SCNC: 25 MMOL/L
COLOR: YELLOW
CREAT SERPL-MCNC: 0.96 MG/DL
EGFR: 72 ML/MIN/1.73M2
GLUCOSE QUALITATIVE U: NEGATIVE MG/DL
GLUCOSE SERPL-MCNC: 138 MG/DL
KETONES URINE: NEGATIVE MG/DL
LEUKOCYTE ESTERASE URINE: NEGATIVE
NITRITE URINE: NEGATIVE
PH URINE: 5.5
PHOSPHATE SERPL-MCNC: 4.6 MG/DL
POTASSIUM SERPL-SCNC: 4.2 MMOL/L
PROTEIN URINE: NORMAL MG/DL
SODIUM SERPL-SCNC: 140 MMOL/L
SPECIFIC GRAVITY URINE: 1.03
UROBILINOGEN URINE: 0.2 MG/DL

## 2024-05-21 ENCOUNTER — NON-APPOINTMENT (OUTPATIENT)
Age: 49
End: 2024-05-21

## 2024-05-22 LAB — RENIN ACTIVITY, PLASMA: 0.48 NG/ML/HR

## 2024-05-24 LAB
CREAT UR-MCNC: 154.2 MG/DL
DOPAMINE UR-MCNC: <15 UG/L
DOPAMINE/CREAT UR: <10 UG/G CREAT
EPINEPH UR-MCNC: <3 UG/L
EPINEPH/CREAT UR: <2 UG/G CREAT
NOREPINEPH UR-MCNC: <15 UG/L
NOREPINEPH/CREAT UR: <10 UG/G CREAT

## 2024-05-30 ENCOUNTER — EMERGENCY (EMERGENCY)
Facility: HOSPITAL | Age: 49
LOS: 1 days | Discharge: HOME | End: 2024-05-30
Attending: EMERGENCY MEDICINE
Payer: COMMERCIAL

## 2024-05-30 VITALS
WEIGHT: 194.01 LBS | OXYGEN SATURATION: 97 % | DIASTOLIC BLOOD PRESSURE: 92 MMHG | HEIGHT: 64 IN | RESPIRATION RATE: 18 BRPM | TEMPERATURE: 99 F | SYSTOLIC BLOOD PRESSURE: 134 MMHG | HEART RATE: 73 BPM

## 2024-05-30 LAB
ALBUMIN SERPL ELPH-MCNC: 4.1 G/DL — SIGNIFICANT CHANGE UP (ref 3.3–5)
ALP SERPL-CCNC: 75 U/L — SIGNIFICANT CHANGE UP (ref 40–120)
ALT FLD-CCNC: 30 U/L — SIGNIFICANT CHANGE UP (ref 10–45)
ANION GAP SERPL CALC-SCNC: 13 MMOL/L — SIGNIFICANT CHANGE UP (ref 5–17)
AST SERPL-CCNC: 23 U/L — SIGNIFICANT CHANGE UP (ref 10–40)
BASOPHILS # BLD AUTO: 0.03 K/UL — SIGNIFICANT CHANGE UP (ref 0–0.2)
BASOPHILS NFR BLD AUTO: 0.5 % — SIGNIFICANT CHANGE UP (ref 0–2)
BILIRUB SERPL-MCNC: 0.4 MG/DL — SIGNIFICANT CHANGE UP (ref 0.2–1.2)
BUN SERPL-MCNC: 12 MG/DL — SIGNIFICANT CHANGE UP (ref 7–23)
CALCIUM SERPL-MCNC: 9.5 MG/DL — SIGNIFICANT CHANGE UP (ref 8.4–10.5)
CHLORIDE SERPL-SCNC: 108 MMOL/L — SIGNIFICANT CHANGE UP (ref 96–108)
CO2 SERPL-SCNC: 21 MMOL/L — LOW (ref 22–31)
CREAT SERPL-MCNC: 0.88 MG/DL — SIGNIFICANT CHANGE UP (ref 0.5–1.3)
EGFR: 80 ML/MIN/1.73M2 — SIGNIFICANT CHANGE UP
EOSINOPHIL # BLD AUTO: 0.29 K/UL — SIGNIFICANT CHANGE UP (ref 0–0.5)
EOSINOPHIL NFR BLD AUTO: 4.4 % — SIGNIFICANT CHANGE UP (ref 0–6)
GLUCOSE SERPL-MCNC: 87 MG/DL — SIGNIFICANT CHANGE UP (ref 70–99)
HCT VFR BLD CALC: 40.3 % — SIGNIFICANT CHANGE UP (ref 34.5–45)
HGB BLD-MCNC: 13.6 G/DL — SIGNIFICANT CHANGE UP (ref 11.5–15.5)
IMM GRANULOCYTES NFR BLD AUTO: 0.2 % — SIGNIFICANT CHANGE UP (ref 0–0.9)
LYMPHOCYTES # BLD AUTO: 2.48 K/UL — SIGNIFICANT CHANGE UP (ref 1–3.3)
LYMPHOCYTES # BLD AUTO: 37.7 % — SIGNIFICANT CHANGE UP (ref 13–44)
MAGNESIUM SERPL-MCNC: 2.3 MG/DL — SIGNIFICANT CHANGE UP (ref 1.6–2.6)
MCHC RBC-ENTMCNC: 28.8 PG — SIGNIFICANT CHANGE UP (ref 27–34)
MCHC RBC-ENTMCNC: 33.7 GM/DL — SIGNIFICANT CHANGE UP (ref 32–36)
MCV RBC AUTO: 85.2 FL — SIGNIFICANT CHANGE UP (ref 80–100)
MONOCYTES # BLD AUTO: 0.54 K/UL — SIGNIFICANT CHANGE UP (ref 0–0.9)
MONOCYTES NFR BLD AUTO: 8.2 % — SIGNIFICANT CHANGE UP (ref 2–14)
NEUTROPHILS # BLD AUTO: 3.23 K/UL — SIGNIFICANT CHANGE UP (ref 1.8–7.4)
NEUTROPHILS NFR BLD AUTO: 49 % — SIGNIFICANT CHANGE UP (ref 43–77)
NRBC # BLD: 0 /100 WBCS — SIGNIFICANT CHANGE UP (ref 0–0)
NT-PROBNP SERPL-SCNC: <36 PG/ML — SIGNIFICANT CHANGE UP (ref 0–300)
PLATELET # BLD AUTO: 228 K/UL — SIGNIFICANT CHANGE UP (ref 150–400)
POTASSIUM SERPL-MCNC: 4.7 MMOL/L — SIGNIFICANT CHANGE UP (ref 3.5–5.3)
POTASSIUM SERPL-SCNC: 4.7 MMOL/L — SIGNIFICANT CHANGE UP (ref 3.5–5.3)
PROT SERPL-MCNC: 6.6 G/DL — SIGNIFICANT CHANGE UP (ref 6–8.3)
RBC # BLD: 4.73 M/UL — SIGNIFICANT CHANGE UP (ref 3.8–5.2)
RBC # FLD: 12.7 % — SIGNIFICANT CHANGE UP (ref 10.3–14.5)
SODIUM SERPL-SCNC: 142 MMOL/L — SIGNIFICANT CHANGE UP (ref 135–145)
TROPONIN T, HIGH SENSITIVITY RESULT: <6 NG/L — SIGNIFICANT CHANGE UP (ref 0–51)
WBC # BLD: 6.58 K/UL — SIGNIFICANT CHANGE UP (ref 3.8–10.5)
WBC # FLD AUTO: 6.58 K/UL — SIGNIFICANT CHANGE UP (ref 3.8–10.5)

## 2024-05-30 PROCEDURE — 71046 X-RAY EXAM CHEST 2 VIEWS: CPT | Mod: 26

## 2024-05-30 PROCEDURE — 99285 EMERGENCY DEPT VISIT HI MDM: CPT

## 2024-05-30 NOTE — ED PROVIDER NOTE - OBJECTIVE STATEMENT
48-year-old female past medical history of hypertension, hypercholesterolemia, cervical disc disease now presenting with chest heaviness.  Patient reports having high blood pressures recorded to 170/102 mmHg, reports intermittent dizzy spells which resolved spontaneously on their own, has also been having shortness of breath while lying down.  For the past 4 to 5 days has been having chest pressure-like discomfort at rest accompanied with left arm heaviness and tingling in her fingers.  Patient denies fevers, sore throat, headaches, vision problems, nausea/vomiting, abdominal pain, urinary/bowel complaints.

## 2024-05-30 NOTE — ED ADULT NURSE NOTE - CAS DISCH BELONGINGS RETURNED
Pt was admitted for elective surgery for left TKR, due to OA, chronic pain and DJD. Pt has a history of multiple comorbidities. Not applicable

## 2024-05-30 NOTE — ED PROVIDER NOTE - CLINICAL SUMMARY MEDICAL DECISION MAKING FREE TEXT BOX
48-year-old female past medical history of hypertension, hypercholesterolemia, cervical disc disease now presenting with chest heaviness. VSS. EKG NSR, Concern for ACS. Will send labs, shall req CDU stay for stress test.

## 2024-05-30 NOTE — ED PROVIDER NOTE - WR ORDER DATE AND TIME 1
Problem: Respiratory:  Goal: Respiratory status will improvement  Outcome: PROGRESSING AS EXPECTED  Intervention: Assess and monitor pulmonary status  Intervention: Administer and titrate oxygen therapy  Intervention: Educate and encourage coughing and deep breathing       Problem: Infection  Goal: Will remain free from infection  Outcome: PROGRESSING AS EXPECTED  Interventions: Assess signs and symptoms of infection (labs, VS per orders). Antibiotics per orders         Problem: Oxygenation/Respiratory Function  Goal: Patient will Achieve/Maintain Optimum Respiratory Rate/Effort  Outcome: PROGRESSING AS EXPECTED  Intervention: Assess/Monitor Rate/Rhythm/Depth of effort of respirations. Assess Oxygenation as Ordered. Administer/Titrate Oxygen as Ordered. Alternate Physical Activity with Rest Periods           30-May-2024 21:34

## 2024-05-30 NOTE — ED ADULT NURSE NOTE - OBJECTIVE STATEMENT
Pt presents to the ED A&Ox4 complaining of chest pressure, shortness of breath and HTN. PMHx htn, congenital heart disease. Pt states that HTN has been going on for Pt presents to the ED A&Ox4 complaining of chest pressure, shortness of breath and HTN. PMHx htn, congenital heart disease. Pt states that HTN has been going on for 2-3 weeks. Endorses developing shortness of breath and chest pressure over the weekend. Pt presents to the ED A&Ox4 complaining of chest pressure, shortness of breath and HTN. PMHx htn, congenital heart disease, endometrial ca. Pt states that HTN has been going on for 2-3 weeks. Endorses developing shortness of breath, dyspnea and chest pressure over the weekend. Pt states sister had a heart attack in the past so came to the ED for further workup due to possible familial heridity. Denies BL leg swelling, fevers, chills, nvd.

## 2024-05-30 NOTE — ED ADULT TRIAGE NOTE - CHIEF COMPLAINT QUOTE
pt endorsing 2 weeks of shortness of breath lying down, 1 day of pressure-like chest pain radiating to L arm  pmhx congenital heart disease

## 2024-05-30 NOTE — ED PROVIDER NOTE - ATTENDING CONTRIBUTION TO CARE
Patient is a 48-year-old female with a history of hypertension, high cholesterol here for evaluation of intermittent chest pressure at rest that radiates to her left arm, lasting 10 minutes at a time.  Patient states that.  Patient states that she has had multiple episode patient states that she has had multiple episodes at rest over the past 2 weeks.  She endorses associated diaphoresis with these episodes.  She also states that she has been dizzy.  She reports that in the past year she has followed up with neurology and cardiology and had outpatient labs.  With neurology, she had MRI imaging because she had an episode of left eye vision loss.  No acute findings were found on the MRI.  Patient reports a strong family history of heart disease in her parents and siblings.  She denies any worsening chest pain with exertion.  She denies any lower extremity swelling.  No history of DVT or PE.      VS noted  Gen. no acute distress, Non toxic   HEENT: EOMI, mmm  Lungs: CTAB/L no C/ W /R   CVS: RRR   Abd; Soft non tender, non distended   Ext: no edema, no calf tenderness bilaterally  Skin: no rash  Neuro AAOx3 non focal clear speech  a/p:  chest pressure with radiation to left arm at rest–EKG is without any ST elevations or depressions.  Plan for labs, chest x-ray.  Consider CDU for further cardiac testing.  - Jeremie ARREDONDO

## 2024-05-31 ENCOUNTER — RESULT REVIEW (OUTPATIENT)
Age: 49
End: 2024-05-31

## 2024-05-31 VITALS
OXYGEN SATURATION: 98 % | SYSTOLIC BLOOD PRESSURE: 106 MMHG | DIASTOLIC BLOOD PRESSURE: 72 MMHG | TEMPERATURE: 98 F | HEART RATE: 71 BPM | RESPIRATION RATE: 16 BRPM

## 2024-05-31 LAB
A1C WITH ESTIMATED AVERAGE GLUCOSE RESULT: 5.6 % — SIGNIFICANT CHANGE UP (ref 4–5.6)
CHOLEST SERPL-MCNC: 149 MG/DL — SIGNIFICANT CHANGE UP
ESTIMATED AVERAGE GLUCOSE: 114 MG/DL — SIGNIFICANT CHANGE UP (ref 68–114)
HDLC SERPL-MCNC: 41 MG/DL — LOW
LIPID PNL WITH DIRECT LDL SERPL: 62 MG/DL — SIGNIFICANT CHANGE UP
NON HDL CHOLESTEROL: 108 MG/DL — SIGNIFICANT CHANGE UP
TRIGL SERPL-MCNC: 296 MG/DL — HIGH
TROPONIN T, HIGH SENSITIVITY RESULT: <6 NG/L — SIGNIFICANT CHANGE UP (ref 0–51)

## 2024-05-31 PROCEDURE — 93005 ELECTROCARDIOGRAM TRACING: CPT | Mod: XU

## 2024-05-31 PROCEDURE — 36000 PLACE NEEDLE IN VEIN: CPT

## 2024-05-31 PROCEDURE — 99236 HOSP IP/OBS SAME DATE HI 85: CPT

## 2024-05-31 PROCEDURE — 80053 COMPREHEN METABOLIC PANEL: CPT

## 2024-05-31 PROCEDURE — 75574 CT ANGIO HRT W/3D IMAGE: CPT | Mod: 26,MC

## 2024-05-31 PROCEDURE — 36000 PLACE NEEDLE IN VEIN: CPT | Mod: XU

## 2024-05-31 PROCEDURE — 75574 CT ANGIO HRT W/3D IMAGE: CPT | Mod: MC

## 2024-05-31 PROCEDURE — G0378: CPT

## 2024-05-31 PROCEDURE — 85025 COMPLETE CBC W/AUTO DIFF WBC: CPT

## 2024-05-31 PROCEDURE — C8929: CPT

## 2024-05-31 PROCEDURE — 83880 ASSAY OF NATRIURETIC PEPTIDE: CPT

## 2024-05-31 PROCEDURE — 76937 US GUIDE VASCULAR ACCESS: CPT

## 2024-05-31 PROCEDURE — 76937 US GUIDE VASCULAR ACCESS: CPT | Mod: 26,59

## 2024-05-31 PROCEDURE — 99285 EMERGENCY DEPT VISIT HI MDM: CPT | Mod: 25

## 2024-05-31 PROCEDURE — 93306 TTE W/DOPPLER COMPLETE: CPT | Mod: 26

## 2024-05-31 PROCEDURE — 36410 VNPNXR 3YR/> PHY/QHP DX/THER: CPT

## 2024-05-31 PROCEDURE — 83036 HEMOGLOBIN GLYCOSYLATED A1C: CPT

## 2024-05-31 PROCEDURE — 84484 ASSAY OF TROPONIN QUANT: CPT

## 2024-05-31 PROCEDURE — 70450 CT HEAD/BRAIN W/O DYE: CPT | Mod: 26,MC

## 2024-05-31 PROCEDURE — 71046 X-RAY EXAM CHEST 2 VIEWS: CPT

## 2024-05-31 PROCEDURE — 83735 ASSAY OF MAGNESIUM: CPT

## 2024-05-31 PROCEDURE — 80061 LIPID PANEL: CPT

## 2024-05-31 PROCEDURE — 70450 CT HEAD/BRAIN W/O DYE: CPT | Mod: MC

## 2024-05-31 RX ORDER — SODIUM CHLORIDE 9 MG/ML
1000 INJECTION INTRAMUSCULAR; INTRAVENOUS; SUBCUTANEOUS ONCE
Refills: 0 | Status: COMPLETED | OUTPATIENT
Start: 2024-05-31 | End: 2024-05-31

## 2024-05-31 RX ORDER — ACETAMINOPHEN 500 MG
975 TABLET ORAL ONCE
Refills: 0 | Status: COMPLETED | OUTPATIENT
Start: 2024-05-31 | End: 2024-05-31

## 2024-05-31 RX ORDER — ATORVASTATIN CALCIUM 80 MG/1
1 TABLET, FILM COATED ORAL
Qty: 30 | Refills: 0
Start: 2024-05-31 | End: 2024-06-29

## 2024-05-31 RX ORDER — ATORVASTATIN CALCIUM 80 MG/1
20 TABLET, FILM COATED ORAL AT BEDTIME
Refills: 0 | Status: DISCONTINUED | OUTPATIENT
Start: 2024-05-31 | End: 2024-06-03

## 2024-05-31 RX ORDER — ASPIRIN/CALCIUM CARB/MAGNESIUM 324 MG
1 TABLET ORAL
Qty: 30 | Refills: 0
Start: 2024-05-31 | End: 2024-06-29

## 2024-05-31 RX ADMIN — ATORVASTATIN CALCIUM 20 MILLIGRAM(S): 80 TABLET, FILM COATED ORAL at 02:04

## 2024-05-31 RX ADMIN — Medication 975 MILLIGRAM(S): at 13:01

## 2024-05-31 RX ADMIN — SODIUM CHLORIDE 1000 MILLILITER(S): 9 INJECTION INTRAMUSCULAR; INTRAVENOUS; SUBCUTANEOUS at 13:01

## 2024-05-31 NOTE — ED CDU PROVIDER DISPOSITION NOTE - NSFOLLOWUPINSTRUCTIONS_ED_ALL_ED_FT
Please follow-up with your primary care doctor within the next 2-3 days for reevaluation.    Please follow-up with your cardiologist Dr. Day within 1 week for re-evaluation.    Rajesh Day  Cardiology  [address], [address]  [place] [address]  Phone: [phone]  Follow Up Time: 4-6 Days    Additionally recommend outpatient follow-up with our neurology clinic if your symptoms of headache and dizziness persist as this may warrant further outpatient workup.    Take aspirin 81 mg daily.  Additionally your atorvastatin dose has been increased from 20 mg to 40 mg as per our cardiology team's recommendations.  Stop your previously prescribed 20 mg Atorvastatin and Start the 40 mg Atorvastatin. Prescriptions for these medications were sent to your pharmacy.  Please take as prescribed.    Return to the Emergency Department immediately if develop any new/worsening symptoms including but not limited to chest pain, weakness, nausea/vomiting, shortness of breath, speech/visual changes, severe headache or any other concerns.

## 2024-05-31 NOTE — ED CDU PROVIDER INITIAL DAY NOTE - PROGRESS NOTE DETAILS
Upon arrival to CDU patient endorses L eye pain/L sided headache. Denies visual changes. Along with chest pain patient had also discussed feeling dizzy over the last few weeks.   Neuro check: A+Ox3, conversational, resting in bed, strength 5/5 bilateral UE/LE, EOMI, pupils reactive to light, clear speech, no facial droop.   Shared decision making had with patient about imaging at this time. Will obtain CT head and reevaluate.  CDU attending Dr. Barroso.- Maya Ledbetter PA-C Patient seen at bedside in NAD.  VSS.  Patient resting comfortably without complaints. Denies current cp, HA, speech/visual changes, sob. Neuro intact. Appears well. RN unable to get 18 gauge, will need US guided IV placement, pt made aware of this by me and agreeable. - BERHANE EspinosaC CTC noted, calcium score 0, does note some areas of noncalcified plaque and luminal irregularities better minimal/mild in nature.  Additionally distal left circumflex and first OM branch were small caliber and not well-visualized.  Noted posterior leaflet of mitral valve appears thickened recommended correlation with echocardiogram.  ED attending Dr. Rivero aware of results, requested cardiology eval given distal left circumflex and first OM branch findings.  Case discussed with cardiology fellow Dr. Nascimento who will staff with unattached cardiologist.  Additionally will add on echocardiogram to further evaluate mitral valve findings.  I updated patient on these results and this plan. - Travis Reardon PA-C TTE negative for acute cardiac pathology, did not note any mitral valve thickening as seen on CT scan. Patient evaluated by cardiologist Dr. Astorga, he verbally recommended aspirin 81 mg and atorvastatin 40 mg (patient already on 20 mg so we will send prescription for new dose) and discharged home with outpatient follow-up.  Patient feels comfortable with plan for discharge home at this time, all questions answered, stable for discharge.- Travis Reardon PA-C

## 2024-05-31 NOTE — ED CDU PROVIDER INITIAL DAY NOTE - NSICDXFAMILYHX_GEN_ALL_CORE_FT
FAMILY HISTORY:  Sibling  Still living? Unknown  Family history of heart attack, Age at diagnosis: Age Unknown

## 2024-05-31 NOTE — ED ADULT NURSE REASSESSMENT NOTE - NS ED NURSE REASSESS COMMENT FT1
Pt received from SALINAS Wallis. Pt oriented to CDU & plan of care was discussed. Pt A&O x 4. Pt in CDU for CTA, CTH, neuro follow up out pt. Pt denies any chest pain, SOB, dizziness or palpitations as of now. Pt on a cardiac monitor in NSR, HR in 60's. V/S stable, pt afebrile, IV in place, patent and free of signs of infiltration. Pt resting in bed. Safety & comfort measures maintained. Call bell in reach. Will continue to monitor.

## 2024-05-31 NOTE — ED CDU PROVIDER INITIAL DAY NOTE - OBJECTIVE STATEMENT
48-year-old female with a history of hypertension, high cholesterol presents with intermittent chest pressure for the last two weeks. States that pain radiates to her left arm, lasts 10 minutes at a time, and is associated with diaphoresis, and intermittent shortness of breath. She also states that she has been feeling dizzy, feeling off balance, denying room spinning sensation. She also reports elevated blood pressures at home, even with taking her Bystolic as prescribed.  She denies any worsening of her chest pain with exertion and denies any lower extremity swelling. She also mentions having intermittent left eye pain/left sided headaches for the last year, although denies current visual changes. Reports having an MRI last year after having visual changes/headache stating she was told that imaging showed "specks of white" and was diagnosed with possible migraines.  No history of DVT or PE. Had seen cardiologist Dr. Rajesh Day for symptoms and is reportedly scheduled for a carotid ultrasound next week. Sister had a heart attack at age 50.   ED course: EKG NSR with SA. Troponin <6x2. CXR clear. Plan for tele monitoring and CTA coronaries in CDU.

## 2024-05-31 NOTE — ED CDU PROVIDER INITIAL DAY NOTE - ATTENDING APP SHARED VISIT CONTRIBUTION OF CARE
asymptomatic. pending cardiac w/u Patient is a 48-year-old female with a history of hypertension, high cholesterol here for evaluation of intermittent chest pressure at rest that radiates to her left arm, lasting 10 minutes at a time.  Patient states that.  Patient states that she has had multiple episode patient states that she has had multiple episodes at rest over the past 2 weeks.  She endorses associated diaphoresis with these episodes.  She also states that she has been dizzy.  She reports that in the past year she has followed up with neurology and cardiology and had outpatient labs.  With neurology, she had MRI imaging because she had an episode of left eye vision loss.  No acute findings were found on the MRI.  Patient reports a strong family history of heart disease in her parents and siblings.  She denies any worsening chest pain with exertion.  She denies any lower extremity swelling.  No history of DVT or PE.    VS noted  Gen. no acute distress, Non toxic   HEENT: EOMI, mmm  Lungs: CTAB/L no C/ W /R   CVS: RRR   Abd; Soft non tender, non distended   Ext: no edema, no calf tenderness bilaterally  Skin: no rash  Neuro AAOx3 non focal clear speech  a/p:  chest pressure with radiation to left arm at rest–EKG is without any ST elevations or depressions.  Plan for labs, chest x-ray.  Plan for CDU for tele monitoring, CTA coronaries, frequent reevaluations  - Jeremie ARREDONDO

## 2024-05-31 NOTE — ED PROCEDURE NOTE - PROCEDURE ADDITIONAL DETAILS
LOV 01/03/23  
Emergency Department Focused Ultrasound performed at patient's bedside for placement of ultrasound guided IV. The study was confirmed with blood return and ease of flushing saline.    Upper extremity laterality: L forearm  IV Gauge: 18G    POCUS: Emergency Department Focused Ultrasound performed at patient's bedside.  The complete report will be available in PACS.

## 2024-05-31 NOTE — ED ADULT NURSE REASSESSMENT NOTE - NS ED NURSE REASSESS COMMENT FT1
Pt received from RN . Pt oriented to CDU & plan of care was discussed. Pt A&O x 4. Independent. Pt in CDU for CT coronaries and tele monitoring. Pt denies any chest pain, SOB, dizziness or palpitations at this time. V/S stable, pt afebrile, IV in place, patent and free of signs of infiltration. Pt resting in bed. Safety & comfort measures maintained. Call bell in reach. Care continues.

## 2024-05-31 NOTE — CONSULT NOTE ADULT - SUBJECTIVE AND OBJECTIVE BOX
DATE OF SERVICE: 05-31-24 @ 16:36    CHIEF COMPLAINT:Patient is a 48y old  Female who presents with a chief complaint of     HISTORY OF PRESENT ILLNESS:HPI: 48-year-old female with a history of hypertension, high cholesterol presents with intermittent chest pressure for the last two weeks. States that pain radiates to her left arm, lasts 10 minutes at a time, and is associated with diaphoresis, and intermittent shortness of breath. She also states that she has been feeling dizzy, feeling off balance, denying room spinning sensation. She also reports elevated blood pressures at home, even with taking her Bystolic as prescribed.  She denies any worsening of her chest pain with exertion and denies any lower extremity swelling. She also mentions having intermittent left eye pain/left sided headaches for the last year, although denies current visual changes. Reports having an MRI last year after having visual changes/headache stating she was told that imaging showed "specks of white" and was diagnosed with possible migraines.  No history of DVT or PE. Had seen cardiologist Dr. Rajesh Day for symptoms and is reportedly scheduled for a carotid ultrasound next week. Sister had a heart attack at age 50.         PAST MEDICAL & SURGICAL HISTORY:  No Past Medical History      Anal Fissure  REPAIR 2009      REVISION  of Episiotomy  2002              MEDICATIONS:            atorvastatin 20 milliGRAM(s) Oral at bedtime        FAMILY HISTORY:  Family history of heart attack (Sibling)        Non-contributory    SOCIAL HISTORY:    [ ] Tobacco  [ ] Drugs  [ ] Alcohol    Allergies    avocado (Other)  Avelox (Unknown)  Percocet (Unknown)    Intolerances    	    REVIEW OF SYSTEMS:  CONSTITUTIONAL: No fever  EYES: No eye pain, visual disturbances, or discharge  ENMT:  No difficulty hearing, tinnitus  NECK: No pain or stiffness  RESPIRATORY: No cough, wheezing,  CARDIOVASCULAR: No chest pain, palpitations, passing out, dizziness, or leg swelling  GASTROINTESTINAL:  No nausea, vomiting, diarrhea or constipation. No melena.  GENITOURINARY: No dysuria, hematuria  NEUROLOGICAL: No stroke like symptoms  SKIN: No burning or lesions   ENDOCRINE: No heat or cold intolerance  MUSCULOSKELETAL: No joint pain or swelling  PSYCHIATRIC: No  anxiety, mood swings  HEME/LYMPH: No bleeding gums  ALLERGY AND IMMUNOLOGIC: No hives or eczema	    All other ROS negative    PHYSICAL EXAM:  T(C): 36.7 (05-31-24 @ 15:54), Max: 36.8 (05-30-24 @ 20:48)  HR: 71 (05-31-24 @ 15:54) (52 - 79)  BP: 106/72 (05-31-24 @ 15:54) (98/70 - 115/72)  RR: 16 (05-31-24 @ 15:54) (16 - 18)  SpO2: 98% (05-31-24 @ 15:54) (95% - 99%)  Wt(kg): --  I&O's Summary      Appearance: Normal	  HEENT:   Normal oral mucosa, EOMI	  Cardiovascular:  S1 S2, No JVD,    Respiratory: Lungs clear to auscultation	  Psychiatry: Alert  Gastrointestinal:  Soft, Non-tender, + BS	  Skin: No rashes   Neurologic: Non-focal  Extremities:  No edema  Vascular: Peripheral pulses palpable    	    	  	  CARDIAC MARKERS:  Labs personally reviewed by me                                  13.6   6.58  )-----------( 228      ( 30 May 2024 21:46 )             40.3     05-30    142  |  108  |  12  ----------------------------<  87  4.7   |  21<L>  |  0.88    Ca    9.5      30 May 2024 21:46  Mg     2.3     05-30    TPro  6.6  /  Alb  4.1  /  TBili  0.4  /  DBili  x   /  AST  23  /  ALT  30  /  AlkPhos  75  05-30    TTE 5/31/24   1. Left ventricular cavity is normal in size. Left ventricular wall thickness is normal. Left ventricular systolic function is normal with an ejection fraction of 65 % by Beckwith's method of disks. There are no regional wall motion abnormalities seen.   2. Normal left ventricular diastolic function, withnormal filling pressure.   3. Normal right ventricular cavity size, with normal wall thickness, and normal systolic function.   4. No pericardial effusion seen.    CTA heart 5/31/24   IMPRESSION:  1. Coronary artery calcium score =  0 Agatston Units.    2. Coronaries:  --LM: angiographically normal.  --LAD: contains non-calcified plaque in the mid segment with mild (less       EKG: Personally reviewed by me - NORMAL SINUS RHYTHM WITH SINUS ARRHYTHMIA  Radiology: Personally reviewed by me - Clear lungs.        Assessment /Plan:    48-year-old female with a history of hypertension, high cholesterol presents with intermittent chest pressure for the last two weeks. States that pain radiates to her left arm, lasts 10 minutes at a time, and is associated with diaphoresis, and intermittent shortness of breath.    1. Chest Pain  - Trop neg x 2   - ECG on ischemic   - CTA heart as above calcium score 0   - TTE as above wnl no WMA  - c/w ASA 81mg and Atorvastatin 20 mg     2. HTN   - controlled   - off hypertensive medication   - will monitor     3. HLD  - c/w Atorvastatin 20 mg     4. DVT prophylactic  - c/w ASA 81mg  - SCD's   - OOB -->  CHAIR            Differential diagnosis and plan of care discussed with patient after the evaluation. Counseling on diet, nutritional counseling, weight management, exercise and medication compliance was done.   Advanced care planning/advanced directives discussed with patient/family. DNR status including forceful chest compressions to attempt to restart the heart, ventilator support/artificial breathing, electric shock, artificial nutrition, health care proxy, Molst form all discussed with pt. Pt wishes to consider. More than fifteen minutes spent on discussing advanced directives.     MISSY Urbina,  [facility]  Cardiovascular Medicine  [address], [address]  Available for call or text via [employer]  Office [phone]   DATE OF SERVICE: 05-31-24 @ 16:36    CHIEF COMPLAINT:Patient is a 48y old  Female who presents with a chief complaint of     HISTORY OF PRESENT ILLNESS:HPI: 48-year-old female with a history of hypertension, high cholesterol presents with intermittent chest pressure for the last two weeks. States that pain radiates to her left arm, lasts 10 minutes at a time, and is associated with diaphoresis, and intermittent shortness of breath. She also states that she has been feeling dizzy, feeling off balance, denying room spinning sensation. She also reports elevated blood pressures at home, even with taking her Bystolic as prescribed.  She denies any worsening of her chest pain with exertion and denies any lower extremity swelling. She also mentions having intermittent left eye pain/left sided headaches for the last year, although denies current visual changes. Reports having an MRI last year after having visual changes/headache stating she was told that imaging showed "specks of white" and was diagnosed with possible migraines.  No history of DVT or PE. Had seen cardiologist Dr. Rajesh Day for symptoms and is reportedly scheduled for a carotid ultrasound next week. Sister had a heart attack at age 50.         PAST MEDICAL & SURGICAL HISTORY:  No Past Medical History      Anal Fissure  REPAIR 2009      REVISION  of Episiotomy  2002              MEDICATIONS:            atorvastatin 20 milliGRAM(s) Oral at bedtime        FAMILY HISTORY:  Family history of heart attack (Sibling)        Non-contributory    SOCIAL HISTORY:    [ ] not a smoker    Allergies    avocado (Other)  Avelox (Unknown)  Percocet (Unknown)    Intolerances    	    REVIEW OF SYSTEMS:  CONSTITUTIONAL: No fever  EYES: No eye pain, visual disturbances, or discharge  ENMT:  No difficulty hearing, tinnitus  NECK: No pain or stiffness  RESPIRATORY: No cough, wheezing,  CARDIOVASCULAR: No chest pain, palpitations, passing out, dizziness, or leg swelling  GASTROINTESTINAL:  No nausea, vomiting, diarrhea or constipation. No melena.  GENITOURINARY: No dysuria, hematuria  NEUROLOGICAL: No stroke like symptoms  SKIN: No burning or lesions   ENDOCRINE: No heat or cold intolerance  MUSCULOSKELETAL: No joint pain or swelling  PSYCHIATRIC: No  anxiety, mood swings  HEME/LYMPH: No bleeding gums  ALLERGY AND IMMUNOLOGIC: No hives or eczema	    All other ROS negative    PHYSICAL EXAM:  T(C): 36.7 (05-31-24 @ 15:54), Max: 36.8 (05-30-24 @ 20:48)  HR: 71 (05-31-24 @ 15:54) (52 - 79)  BP: 106/72 (05-31-24 @ 15:54) (98/70 - 115/72)  RR: 16 (05-31-24 @ 15:54) (16 - 18)  SpO2: 98% (05-31-24 @ 15:54) (95% - 99%)  Wt(kg): --  I&O's Summary      Appearance: Normal	  HEENT:   Normal oral mucosa, EOMI	  Cardiovascular:  S1 S2, No JVD,    Respiratory: Lungs clear to auscultation	  Psychiatry: Alert  Gastrointestinal:  Soft, Non-tender, + BS	  Skin: No rashes   Neurologic: Non-focal  Extremities:  No edema  Vascular: Peripheral pulses palpable    	    	  	  CARDIAC MARKERS:  Labs personally reviewed by me                                  13.6   6.58  )-----------( 228      ( 30 May 2024 21:46 )             40.3     05-30    142  |  108  |  12  ----------------------------<  87  4.7   |  21<L>  |  0.88    Ca    9.5      30 May 2024 21:46  Mg     2.3     05-30    TPro  6.6  /  Alb  4.1  /  TBili  0.4  /  DBili  x   /  AST  23  /  ALT  30  /  AlkPhos  75  05-30    TTE 5/31/24   1. Left ventricular cavity is normal in size. Left ventricular wall thickness is normal. Left ventricular systolic function is normal with an ejection fraction of 65 % by Beckwith's method of disks. There are no regional wall motion abnormalities seen.   2. Normal left ventricular diastolic function, withnormal filling pressure.   3. Normal right ventricular cavity size, with normal wall thickness, and normal systolic function.   4. No pericardial effusion seen.    CTA heart 5/31/24   IMPRESSION:  1. Coronary artery calcium score =  0 Agatston Units.    2. Coronaries:  --LM: angiographically normal.  --LAD: contains non-calcified plaque in the mid segment with mild (less       EKG: Personally reviewed by me - NORMAL SINUS RHYTHM WITH SINUS ARRHYTHMIA  Radiology: Personally reviewed by me - Clear lungs.        Assessment /Plan:    48-year-old female with a history of hypertension, high cholesterol presents with intermittent chest pressure for the last two weeks. States that pain radiates to her left arm, lasts 10 minutes at a time, and is associated with diaphoresis, and intermittent shortness of breath.    1. Chest Pain  - Trop neg x 2   - ECG on ischemic   - CTA heart as above calcium score 0 and mild-mod nonobstructive disease  - TTE as above wnl no WMA  - Start ASA 81mg and increase Atorvastatin to 40 mg     2. HTN   - controlled   - off hypertensive medication   - will monitor     3. HLD  - c/w Atorvastatin 20 mg     4. DVT prophylactic  - c/w ASA 81mg  - SCD's   - OOB -->  CHAIR            Differential diagnosis and plan of care discussed with patient after the evaluation. Counseling on diet, nutritional counseling, weight management, exercise and medication compliance was done.   Advanced care planning/advanced directives discussed with patient/family. DNR status including forceful chest compressions to attempt to restart the heart, ventilator support/artificial breathing, electric shock, artificial nutrition, health care proxy, Molst form all discussed with pt. Pt wishes to consider. More than fifteen minutes spent on discussing advanced directives.     MISSY Urbina, DO [facility]  Cardiovascular Medicine  [address], [address]  Available for call or text via [employer]  Office [phone]

## 2024-05-31 NOTE — ED PROCEDURE NOTE - ATTENDING APP SHARED VISIT CONTRIBUTION OF CARE
Dr. KARSTEN Rivero:  I have personally evaluated the patient and have documented above as appropriate. I perfomed a substantive  (greater than 50%) portion of the visit including all aspects of the medical decision making.

## 2024-05-31 NOTE — ED CDU PROVIDER DISPOSITION NOTE - CARE PROVIDER_API CALL
Rajesh Day  Cardiology  [address], [address]  [place] [address]  Phone: [phone]  Fax: [phone]  Follow Up Time: 4-6 Days

## 2024-05-31 NOTE — ED CDU PROVIDER DISPOSITION NOTE - CLINICAL COURSE
48-year-old female with a history of hypertension, high cholesterol presents with intermittent chest pressure for the last two weeks. States that pain radiates to her left arm, lasts 10 minutes at a time, and is associated with diaphoresis, and intermittent shortness of breath. She also states that she has been feeling dizzy, feeling off balance, denying room spinning sensation. She also reports elevated blood pressures at home, even with taking her Bystolic as prescribed.  She denies any worsening of her chest pain with exertion and denies any lower extremity swelling. She also mentions having intermittent left eye pain/left sided headaches for the last year, although denies current visual changes. Reports having an MRI last year after having visual changes/headache stating she was told that imaging showed "specks of white" and was diagnosed with possible migraines.  No history of DVT or PE. Had seen cardiologist Dr. Rajesh Day for symptoms and is reportedly scheduled for a carotid ultrasound next week. Sister had a heart attack at age 50.   ED course: EKG NSR with SA. Troponin <6x2. CXR clear. Plan for tele monitoring and CTA coronaries in CDU. 48-year-old female with a history of hypertension, high cholesterol presents with intermittent chest pressure for the last two weeks. States that pain radiates to her left arm, lasts 10 minutes at a time, and is associated with diaphoresis, and intermittent shortness of breath. She also states that she has been feeling dizzy, feeling off balance, denying room spinning sensation. She also reports elevated blood pressures at home, even with taking her Bystolic as prescribed.  She denies any worsening of her chest pain with exertion and denies any lower extremity swelling. She also mentions having intermittent left eye pain/left sided headaches for the last year, although denies current visual changes. Reports having an MRI last year after having visual changes/headache stating she was told that imaging showed "specks of white" and was diagnosed with possible migraines.  No history of DVT or PE. Had seen cardiologist Dr. Rajesh Day for symptoms and is reportedly scheduled for a carotid ultrasound next week. Sister had a heart attack at age 50.   ED course: EKG NSR with SA. Troponin <6x2. CXR clear. Plan for tele monitoring and CTA coronaries in CDU. Patient felt well in CDU overnight.  Did have episode of dizziness and headache in CDU overnight for which CTH was ordered and was negative for acute pathology. CTC resulted with calcium score of 0 and some scattered noncalcified plaque and minimal luminal stenosis.  All areas of plaque and stenosis were less than 50%.  Additionally the posterior leaflet of mitral valve appeared thickened, recommended echocardiogram to eval which was negative for acute pathology.  Some aspects of coronary arteries were not directly visualized on CTC, given this cardiologist Dr. Astorga was consulted and evaluated patient, cleared patient for discharge home with recommendation for baby aspirin and atorvastatin 40 mg on discharge. Case discussed with ED attending mayank Calhoun for discharge home with outpatient cardiology follow-up.

## 2024-05-31 NOTE — ED CDU PROVIDER INITIAL DAY NOTE - NEUROLOGICAL, MLM
Alert and oriented, clear speech, follows commands, sensation grossly intact, strength 5/5 bilateral UE/LE

## 2024-05-31 NOTE — ED CDU PROVIDER DISPOSITION NOTE - PATIENT PORTAL LINK FT
You can access the FollowMyHealth Patient Portal offered by [facility] by registering at the following website: [email or web address]. By joining [facility]’s FollowMyHealth portal, you will also be able to view your health information using other applications (apps) compatible with our system.

## 2024-05-31 NOTE — ED CDU PROVIDER INITIAL DAY NOTE - DETAILS
tele, CTA coronaries, vitals every 4hours, frequent reevaluations  DW ED team  CTH, likely outpatient neurology follow up   consider cardiology consult

## 2024-06-05 ENCOUNTER — OUTPATIENT (OUTPATIENT)
Dept: OUTPATIENT SERVICES | Facility: HOSPITAL | Age: 49
LOS: 1 days | End: 2024-06-05
Payer: COMMERCIAL

## 2024-06-05 ENCOUNTER — APPOINTMENT (OUTPATIENT)
Dept: ULTRASOUND IMAGING | Facility: HOSPITAL | Age: 49
End: 2024-06-05
Payer: COMMERCIAL

## 2024-06-05 DIAGNOSIS — R07.9 CHEST PAIN, UNSPECIFIED: ICD-10-CM

## 2024-06-05 PROCEDURE — 93880 EXTRACRANIAL BILAT STUDY: CPT | Mod: 26

## 2024-06-05 PROCEDURE — 93880 EXTRACRANIAL BILAT STUDY: CPT

## 2024-06-19 ENCOUNTER — APPOINTMENT (OUTPATIENT)
Dept: ULTRASOUND IMAGING | Facility: CLINIC | Age: 49
End: 2024-06-19
Payer: COMMERCIAL

## 2024-06-19 ENCOUNTER — OUTPATIENT (OUTPATIENT)
Dept: OUTPATIENT SERVICES | Facility: HOSPITAL | Age: 49
LOS: 1 days | End: 2024-06-19
Payer: COMMERCIAL

## 2024-06-19 DIAGNOSIS — R07.9 CHEST PAIN, UNSPECIFIED: ICD-10-CM

## 2024-06-19 PROCEDURE — 93975 VASCULAR STUDY: CPT

## 2024-06-19 PROCEDURE — 93975 VASCULAR STUDY: CPT | Mod: 26

## 2024-07-01 ENCOUNTER — APPOINTMENT (OUTPATIENT)
Dept: OBGYN | Facility: CLINIC | Age: 49
End: 2024-07-01
Payer: COMMERCIAL

## 2024-07-01 VITALS
TEMPERATURE: 97.5 F | DIASTOLIC BLOOD PRESSURE: 72 MMHG | OXYGEN SATURATION: 98 % | HEIGHT: 64 IN | SYSTOLIC BLOOD PRESSURE: 112 MMHG | HEART RATE: 60 BPM | BODY MASS INDEX: 33.12 KG/M2 | WEIGHT: 194 LBS

## 2024-07-01 DIAGNOSIS — Z01.419 ENCOUNTER FOR GYNECOLOGICAL EXAMINATION (GENERAL) (ROUTINE) W/OUT ABNORMAL FINDINGS: ICD-10-CM

## 2024-07-01 DIAGNOSIS — N95.2 POSTMENOPAUSAL ATROPHIC VAGINITIS: ICD-10-CM

## 2024-07-01 DIAGNOSIS — N39.3 STRESS INCONTINENCE (FEMALE) (MALE): ICD-10-CM

## 2024-07-01 DIAGNOSIS — B97.7 PAPILLOMAVIRUS AS THE CAUSE OF DISEASES CLASSIFIED ELSEWHERE: ICD-10-CM

## 2024-07-01 PROCEDURE — 99396 PREV VISIT EST AGE 40-64: CPT

## 2024-07-01 PROCEDURE — 99213 OFFICE O/P EST LOW 20 MIN: CPT | Mod: 25

## 2024-07-02 ENCOUNTER — APPOINTMENT (OUTPATIENT)
Dept: CARDIOLOGY | Facility: CLINIC | Age: 49
End: 2024-07-02

## 2024-07-02 ENCOUNTER — TRANSCRIPTION ENCOUNTER (OUTPATIENT)
Age: 49
End: 2024-07-02

## 2024-07-09 LAB
CANDIDA VAG CYTO: NOT DETECTED
CYTOLOGY CVX/VAG DOC THIN PREP: NORMAL
G VAGINALIS+PREV SP MTYP VAG QL MICRO: NOT DETECTED
HPV 16 E6+E7 MRNA CVX QL NAA+PROBE: NOT DETECTED
HPV HIGH+LOW RISK DNA PNL CVX: NOT DETECTED
HPV18+45 E6+E7 MRNA CVX QL NAA+PROBE: NOT DETECTED
T VAGINALIS VAG QL WET PREP: NOT DETECTED

## 2024-07-26 NOTE — ED CDU PROVIDER DISPOSITION NOTE - DISCHARGE DATE
"Encounter Date: 2024    SCRIBE #1 NOTE: I, Alfred Claire, am scribing for, and in the presence of,  Rupa Myrick MD. I have scribed the following portions of the note - Other sections scribed: HPI, ROS, PE.       History     Chief Complaint   Patient presents with    Hypertension     Per EMS pt reports hx of HTN and has not taken meds today. +HA, +weakness     Time seen by physician: 7:10 PM    This is a 85 y.o. female who presents with no active complaint. She states that she had an "asthma attack" last night.  She was short of breath at that time but denies any shortness breath now.  She denies any chest pain.  She denies headache and dizziness. She denies nausea, vomiting, diarrhea, or any urinary symptoms.  She denies any pain at this time.    Patient denies any other complaints at this time.    The history is provided by the patient.     Review of patient's allergies indicates:  No Known Allergies  Past Medical History:   Diagnosis Date    Asthma     COPD exacerbation 2024    Hypertension      Past Surgical History:   Procedure Laterality Date    HYSTERECTOMY       Family History   Problem Relation Name Age of Onset    Hypertension Mother      Stroke Father       Social History     Tobacco Use    Smoking status: Former     Current packs/day: 0.00     Types: Cigarettes     Quit date: 3/28/1995     Years since quittin.3    Smokeless tobacco: Never   Substance Use Topics    Alcohol use: Yes     Comment: occasional/social    Drug use: Never     Review of Systems   Constitutional:  Negative for chills and fever.   HENT:  Negative for congestion and rhinorrhea.    Respiratory:  Negative for chest tightness and shortness of breath.    Cardiovascular:  Negative for chest pain and palpitations.   Gastrointestinal:  Negative for abdominal pain, diarrhea, nausea and vomiting.   Genitourinary:  Negative for dysuria and flank pain.   Musculoskeletal:  Negative for back pain and neck pain.   Skin:  Negative for " color change and wound.   Neurological:  Negative for dizziness and headaches.       Physical Exam     Initial Vitals [07/25/24 1847]   BP Pulse Resp Temp SpO2   (!) 201/84 78 18 98.3 °F (36.8 °C) (!) 94 %      MAP       --         Physical Exam    Nursing note and vitals reviewed.  Constitutional: She appears well-developed and well-nourished.   HENT:   Head: Normocephalic and atraumatic.   Eyes: Conjunctivae are normal.   Neck: Neck supple.   Normal range of motion.  Cardiovascular:  Normal rate, regular rhythm and normal heart sounds.           Pulmonary/Chest: Breath sounds normal. No respiratory distress. She has no wheezes. She has no rales.   Lungs clear.   Abdominal: Abdomen is soft. Bowel sounds are normal. She exhibits no distension. There is no abdominal tenderness. There is no rebound.   Musculoskeletal:         General: Normal range of motion.      Cervical back: Normal range of motion and neck supple.     Neurological: She is alert and oriented to person, place, and time.   Ambulatory with steady gait.   Skin: Skin is warm and dry. Capillary refill takes less than 2 seconds.         ED Course   Procedures  Labs Reviewed   COMPREHENSIVE METABOLIC PANEL - Abnormal       Result Value    Sodium 137      Potassium 5.2 (*)     Chloride 106      CO2 19 (*)     Glucose 86      BUN 25 (*)     Creatinine 1.5 (*)     Calcium 9.8      Total Protein 7.5      Albumin 4.0      Total Bilirubin 0.6      Alkaline Phosphatase 47 (*)     AST 22      ALT 11      eGFR 34 (*)     Anion Gap 12     URINALYSIS - Abnormal    Specimen UA Urine, Clean Catch      Color, UA Yellow      Appearance, UA Clear      pH, UA 6.0      Specific Gravity, UA 1.020      Protein, UA 1+ (*)     Glucose, UA Negative      Ketones, UA Negative      Bilirubin (UA) Negative      Occult Blood UA Negative      Nitrite, UA Negative      Urobilinogen, UA 2.0-3.0 (*)     Leukocytes, UA Negative     URINALYSIS MICROSCOPIC - Abnormal    RBC, UA 2      WBC,  UA 6 (*)     Bacteria Occasional      Squam Epithel, UA 0      Hyaline Casts, UA 9 (*)     Microscopic Comment SEE COMMENT     CBC W/ AUTO DIFFERENTIAL    WBC 4.34      RBC 4.30      Hemoglobin 12.3      Hematocrit 37.9      MCV 88      MCH 28.6      MCHC 32.5      RDW 13.9      Platelets 250      MPV 9.8      Immature Granulocytes 0.2      Gran # (ANC) 2.4      Immature Grans (Abs) 0.01      Lymph # 1.4      Mono # 0.3      Eos # 0.2      Baso # 0.05      nRBC 0      Gran % 54.8      Lymph % 31.6      Mono % 7.8      Eosinophil % 4.4      Basophil % 1.2      Differential Method Automated       EKG Readings: (Independently Interpreted)   8:07PM:  Rate of 65.  Normal sinus rhythm.  Normal axis.  Normal intervals.  No ST or ischemic changes.       Imaging Results    None          Medications - No data to display  Medical Decision Making  7:10PM:  Patient is an 85-year-old female who presents to the emergency department with concerns of elevated blood pressure per EMS.  Patient denies any concerns to me and states that she feels well and denies any issues at this time.  She is mildly hypertensive but otherwise appears well, nontoxic.  Will plan to check labs, cardiac monitoring, will continue to follow and reassess.    Amount and/or Complexity of Data Reviewed  Independent Historian: EMS  External Data Reviewed: notes.  Labs: ordered. Decision-making details documented in ED Course.  ECG/medicine tests: ordered and independent interpretation performed. Decision-making details documented in ED Course.    11:15 PM:  Patient doing well, remains stable.  Her blood pressure has improved.  She remains content and denies any issues at this time.  She was able to ambulate with no issues.  I spoke with her niece who is her primary caregiver, who states that she was sent to the ED because the patient had stated that she was not feeling well and she was having tremors of her right arm.  Her labs are otherwise unremarkable with no  evidence of electrolyte derangements or UTI.   She was able to ambulate with no issues.  I do feel that the patient is stable for outpatient management as needed.  I do not feel that further work up in the ED is indicated at this time.  I updated pt regarding results and I counseled pt regarding supportive care measures.  I have discussed with the pt ED return warnings and need for close PCP f/u.  Pt agreeable to plan and all questions answered.  I feel that pt is stable for discharge and management as an outpatient and no further intervention is needed at this time.  Pt is comfortable returning to the ED if needed.  Will DC home in stable condition.          Scribe Attestation:   Scribe #1: I performed the above scribed service and the documentation accurately describes the services I performed. I attest to the accuracy of the note.    Physician Attestation for Scribe: I, Rupa Myrick, reviewed documentation as scribed in my presence, which is both accurate and complete.                             Clinical Impression:  Final diagnoses:  [R03.0] Elevated blood pressure reading  [I10] Hypertension, unspecified type (Primary)          ED Disposition Condition    Discharge Stable          ED Prescriptions    None       Follow-up Information       Follow up With Specialties Details Why Contact Info    Primary Care Physician                 Rupa Myrick MD  07/26/24 2603     31-May-2024

## 2024-07-29 ENCOUNTER — NON-APPOINTMENT (OUTPATIENT)
Age: 49
End: 2024-07-29

## 2024-07-29 ENCOUNTER — APPOINTMENT (OUTPATIENT)
Dept: CARDIOLOGY | Facility: CLINIC | Age: 49
End: 2024-07-29
Payer: COMMERCIAL

## 2024-07-29 VITALS
SYSTOLIC BLOOD PRESSURE: 93 MMHG | DIASTOLIC BLOOD PRESSURE: 62 MMHG | HEART RATE: 67 BPM | HEIGHT: 64 IN | WEIGHT: 196 LBS | BODY MASS INDEX: 33.46 KG/M2 | OXYGEN SATURATION: 96 %

## 2024-07-29 DIAGNOSIS — R09.89 OTHER SPECIFIED SYMPTOMS AND SIGNS INVOLVING THE CIRCULATORY AND RESPIRATORY SYSTEMS: ICD-10-CM

## 2024-07-29 PROCEDURE — 93000 ELECTROCARDIOGRAM COMPLETE: CPT

## 2024-07-29 PROCEDURE — 99214 OFFICE O/P EST MOD 30 MIN: CPT | Mod: 25

## 2024-07-29 PROCEDURE — G2211 COMPLEX E/M VISIT ADD ON: CPT | Mod: NC

## 2024-07-29 NOTE — ASSESSMENT
[FreeTextEntry1] : Palpitations controlled as is Bp on nevibilol  She has some coronary  and carotid atherosclerosis without stenosis.  .  Lipids as noted above.  Elevated lipoprotein small a elevated glucose  Negative workup for secondary hypertension.  DEIDRA using CPAP.  Elevated blood pressure may have been related to dietary nonadherence as well as stress

## 2024-07-29 NOTE — CARDIOLOGY SUMMARY
[de-identified] : July 8, 2022 sinus rhythm within normal limits March 2, 2023 sinus rhythm nonspecific ST-T change 8.11.23 sinus wnl May 16, 2024 sinus WNL March 2024 normal July 2024 sinus rhythm [de-identified] : 2020 normal treadmill\par  August 2022 completed [place] stage II without ST changes normal stress echo study reported [de-identified] : 2022 normal LV function 2024 normal LV [facility] [de-identified] : 2023 CAC1 mild non calcified plaque [de-identified] : 2/23 carotid Duplex + mild plaque

## 2024-07-29 NOTE — DISCUSSION/SUMMARY
[Patient] : the patient [Risks] : risks [Benefits] : benefits [Alternatives] : alternatives [___ Month(s)] : in [unfilled] month(s) [FreeTextEntry1] : Reviewed all her recent testing questions addressed.  Compliance history obtained with low-sodium diet discussed follow-up with PMD see me in 4 months [EKG obtained to assist in diagnosis and management of assessed problem(s)] : EKG obtained to assist in diagnosis and management of assessed problem(s)

## 2024-07-29 NOTE — HISTORY OF PRESENT ILLNESS
[FreeTextEntry1] : Had a ER visit she related to stress but also was having a lot of salty foods frozen TV dinners for 2 weeks.  Feels okay now no chest pain shortness of breath palpitations.

## 2025-01-13 DIAGNOSIS — I65.23 OCCLUSION AND STENOSIS OF BILATERAL CAROTID ARTERIES: ICD-10-CM

## 2025-01-13 DIAGNOSIS — R00.2 PALPITATIONS: ICD-10-CM

## 2025-01-13 DIAGNOSIS — E78.41 ELEVATED LIPOPROTEIN(A): ICD-10-CM

## 2025-01-13 DIAGNOSIS — G47.33 OBSTRUCTIVE SLEEP APNEA (ADULT) (PEDIATRIC): ICD-10-CM

## 2025-01-13 DIAGNOSIS — R03.0 ELEVATED BLOOD-PRESSURE READING, W/OUT DIAGNOSIS OF HYPERTENSION: ICD-10-CM

## 2025-02-20 ENCOUNTER — NON-APPOINTMENT (OUTPATIENT)
Age: 50
End: 2025-02-20

## 2025-02-20 ENCOUNTER — APPOINTMENT (OUTPATIENT)
Dept: CARDIOLOGY | Facility: CLINIC | Age: 50
End: 2025-02-20
Payer: COMMERCIAL

## 2025-02-20 VITALS
BODY MASS INDEX: 32.44 KG/M2 | HEART RATE: 59 BPM | DIASTOLIC BLOOD PRESSURE: 72 MMHG | OXYGEN SATURATION: 95 % | SYSTOLIC BLOOD PRESSURE: 108 MMHG | HEIGHT: 64 IN | WEIGHT: 190 LBS

## 2025-02-20 DIAGNOSIS — R03.0 ELEVATED BLOOD-PRESSURE READING, W/OUT DIAGNOSIS OF HYPERTENSION: ICD-10-CM

## 2025-02-20 DIAGNOSIS — I65.23 OCCLUSION AND STENOSIS OF BILATERAL CAROTID ARTERIES: ICD-10-CM

## 2025-02-20 DIAGNOSIS — I25.10 ATHEROSCLEROTIC HEART DISEASE OF NATIVE CORONARY ARTERY W/OUT ANGINA PECTORIS: ICD-10-CM

## 2025-02-20 DIAGNOSIS — E78.41 ELEVATED LIPOPROTEIN(A): ICD-10-CM

## 2025-02-20 PROCEDURE — G2211 COMPLEX E/M VISIT ADD ON: CPT | Mod: NC

## 2025-02-20 PROCEDURE — 99214 OFFICE O/P EST MOD 30 MIN: CPT

## 2025-02-20 PROCEDURE — 93000 ELECTROCARDIOGRAM COMPLETE: CPT

## 2025-02-20 RX ORDER — PANTOPRAZOLE SODIUM 40 MG/10ML
40 INJECTION, POWDER, FOR SOLUTION INTRAVENOUS
Refills: 0 | Status: ACTIVE | COMMUNITY

## 2025-02-27 NOTE — HISTORY OF PRESENT ILLNESS
[FreeTextEntry1] : Seen as an add-on reported that yesterday had chest pressure episodes for which she took aspirin as well as very elevated blood pressures over 170 systolic.  Feels well today.  He has stress related to work.  Diet is not restricted including nonrestricted sodium.  Daughter graduating shortly.
PAST MEDICAL HISTORY:  Anemia     HLD (hyperlipidemia)

## 2025-08-20 ENCOUNTER — APPOINTMENT (OUTPATIENT)
Dept: OBGYN | Facility: CLINIC | Age: 50
End: 2025-08-20
Payer: COMMERCIAL

## 2025-08-20 VITALS
SYSTOLIC BLOOD PRESSURE: 126 MMHG | WEIGHT: 201 LBS | HEIGHT: 64 IN | TEMPERATURE: 97.6 F | BODY MASS INDEX: 34.31 KG/M2 | DIASTOLIC BLOOD PRESSURE: 70 MMHG | OXYGEN SATURATION: 96 % | HEART RATE: 68 BPM

## 2025-08-20 DIAGNOSIS — Z12.72 ENCOUNTER FOR SCREENING FOR MALIGNANT NEOPLASM OF VAGINA: ICD-10-CM

## 2025-08-20 DIAGNOSIS — B97.7 PAPILLOMAVIRUS AS THE CAUSE OF DISEASES CLASSIFIED ELSEWHERE: ICD-10-CM

## 2025-08-20 DIAGNOSIS — Z01.419 ENCOUNTER FOR GYNECOLOGICAL EXAMINATION (GENERAL) (ROUTINE) W/OUT ABNORMAL FINDINGS: ICD-10-CM

## 2025-08-20 PROCEDURE — 99396 PREV VISIT EST AGE 40-64: CPT

## 2025-08-22 LAB — HPV HIGH+LOW RISK DNA PNL CVX: NOT DETECTED

## 2025-08-25 LAB — CYTOLOGY CVX/VAG DOC THIN PREP: NORMAL

## 2025-08-28 ENCOUNTER — APPOINTMENT (OUTPATIENT)
Dept: CARDIOLOGY | Facility: CLINIC | Age: 50
End: 2025-08-28
Payer: COMMERCIAL

## 2025-08-28 VITALS
WEIGHT: 200 LBS | HEIGHT: 64 IN | RESPIRATION RATE: 19 BRPM | HEART RATE: 72 BPM | OXYGEN SATURATION: 98 % | DIASTOLIC BLOOD PRESSURE: 72 MMHG | BODY MASS INDEX: 34.15 KG/M2 | SYSTOLIC BLOOD PRESSURE: 107 MMHG

## 2025-08-28 DIAGNOSIS — R06.09 OTHER FORMS OF DYSPNEA: ICD-10-CM

## 2025-08-28 DIAGNOSIS — I25.10 ATHEROSCLEROTIC HEART DISEASE OF NATIVE CORONARY ARTERY W/OUT ANGINA PECTORIS: ICD-10-CM

## 2025-08-28 DIAGNOSIS — R03.0 ELEVATED BLOOD-PRESSURE READING, W/OUT DIAGNOSIS OF HYPERTENSION: ICD-10-CM

## 2025-08-28 DIAGNOSIS — R60.0 LOCALIZED EDEMA: ICD-10-CM

## 2025-08-28 PROCEDURE — G2211 COMPLEX E/M VISIT ADD ON: CPT | Mod: NC

## 2025-08-28 PROCEDURE — 93000 ELECTROCARDIOGRAM COMPLETE: CPT

## 2025-08-28 PROCEDURE — 99214 OFFICE O/P EST MOD 30 MIN: CPT
